# Patient Record
Sex: FEMALE | Race: WHITE | Employment: PART TIME | URBAN - NONMETROPOLITAN AREA
[De-identification: names, ages, dates, MRNs, and addresses within clinical notes are randomized per-mention and may not be internally consistent; named-entity substitution may affect disease eponyms.]

---

## 2021-05-21 ENCOUNTER — OFFICE VISIT (OUTPATIENT)
Dept: CARDIOLOGY CLINIC | Age: 76
End: 2021-05-21
Payer: MEDICARE

## 2021-05-21 VITALS
DIASTOLIC BLOOD PRESSURE: 70 MMHG | WEIGHT: 105 LBS | HEART RATE: 63 BPM | BODY MASS INDEX: 17.93 KG/M2 | SYSTOLIC BLOOD PRESSURE: 130 MMHG | HEIGHT: 64 IN

## 2021-05-21 DIAGNOSIS — I73.9 PAD (PERIPHERAL ARTERY DISEASE) (HCC): ICD-10-CM

## 2021-05-21 DIAGNOSIS — E78.49 OTHER HYPERLIPIDEMIA: ICD-10-CM

## 2021-05-21 DIAGNOSIS — Z72.0 TOBACCO ABUSE: ICD-10-CM

## 2021-05-21 DIAGNOSIS — I10 ESSENTIAL HYPERTENSION: ICD-10-CM

## 2021-05-21 PROCEDURE — 4004F PT TOBACCO SCREEN RCVD TLK: CPT | Performed by: INTERNAL MEDICINE

## 2021-05-21 PROCEDURE — 1090F PRES/ABSN URINE INCON ASSESS: CPT | Performed by: INTERNAL MEDICINE

## 2021-05-21 PROCEDURE — 93000 ELECTROCARDIOGRAM COMPLETE: CPT | Performed by: INTERNAL MEDICINE

## 2021-05-21 PROCEDURE — G8427 DOCREV CUR MEDS BY ELIG CLIN: HCPCS | Performed by: INTERNAL MEDICINE

## 2021-05-21 PROCEDURE — G8400 PT W/DXA NO RESULTS DOC: HCPCS | Performed by: INTERNAL MEDICINE

## 2021-05-21 PROCEDURE — G8419 CALC BMI OUT NRM PARAM NOF/U: HCPCS | Performed by: INTERNAL MEDICINE

## 2021-05-21 PROCEDURE — 99204 OFFICE O/P NEW MOD 45 MIN: CPT | Performed by: INTERNAL MEDICINE

## 2021-05-21 PROCEDURE — 4040F PNEUMOC VAC/ADMIN/RCVD: CPT | Performed by: INTERNAL MEDICINE

## 2021-05-21 PROCEDURE — 1123F ACP DISCUSS/DSCN MKR DOCD: CPT | Performed by: INTERNAL MEDICINE

## 2021-05-21 RX ORDER — ATORVASTATIN CALCIUM 10 MG/1
TABLET, FILM COATED ORAL
COMMUNITY
Start: 2021-02-19

## 2021-05-21 RX ORDER — LISINOPRIL AND HYDROCHLOROTHIAZIDE 25; 20 MG/1; MG/1
TABLET ORAL
COMMUNITY
Start: 2021-03-05

## 2021-05-21 RX ORDER — ASPIRIN 81 MG/1
81 TABLET ORAL DAILY
COMMUNITY

## 2021-05-21 RX ORDER — CLOPIDOGREL BISULFATE 75 MG/1
75 TABLET ORAL DAILY
COMMUNITY
Start: 2020-08-19

## 2021-05-21 NOTE — PROGRESS NOTES
[address]   Cardiac Evaluation      Patient: Madi Carbajal  YOB: 1945         Chief Complaint   Patient presents with    Hypertension    Hyperlipidemia        Referring provider: Frandy GREEN DO    History of Present Illness:   Ms Mir Rodriguez is seen today to establish with cardiology. She previously followed with [facility] cardiology, but her insurance has changed so she has to switch cardiologists. Her cardiac history hypertension, hyperlipidemia. 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 She smokes 0.5 PPD currently, but has smoked more in the past. Family history of mother having CABG in her [de-identified]. Maxime Mcclellan denies any chest pain, palpitations, dizziness, or edema. She works at [employer] in [place] and works 10-12 hour days. Maxime Mcclellan sleeps ok at night. Jessica's 2 grandsons live with her. She has claudication on occasion. She had a neg lexiscan GXT in 2019. She has never had a cardiac cath. Past Medical History:   has a past medical history of CAD (coronary artery disease), Hyperlipidemia, Hypertension, and PAD (peripheral artery disease) ([facility] [address] Surgical History:   has a past surgical history that includes IR ANGIOPLASTY FEM POP W STENT. Current Outpatient Medications   Medication Sig Dispense Refill    lisinopril-hydroCHLOROthiazide (PRINZIDE;ZESTORETIC) 20-25 MG per tablet TAKE ONE TABLET BY MOUTH EVERY DAY      clopidogrel (PLAVIX) 75 MG tablet Take 75 mg by mouth daily      atorvastatin (LIPITOR) 10 MG tablet TAKE ONE TABLET BY MOUTH EVERY DAY      aspirin 81 MG EC tablet Take 81 mg by mouth daily      Potassium Chloride Jessie ER (KLOR-CON M20 PO) Take by mouth daily       No current facility-administered medications for this visit. Allergies:  Patient has no known allergies.      Social History:  Social History     Socioeconomic History    Marital status: Unknown     Spouse name: Not on file    Number of children: Not blood in stools. No change in bowel or bladder habits. · Genitourinary: No nocturia, dysuria, trouble voiding  · Musculoskeletal:  No gait disturbance, weakness or joint complaints. · Integumentary: No rash or pruritis. · Neurological: No headache, change in muscle strength, numbness or tingling. No change in gait, balance, coordination, mood, affect, memory, mentation, behavior. · Psychiatric: No anxiety or depression  · Endocrine: No malaise or fever  · Hematologic/Lymphatic: No abnormal bruising or bleeding, blood clots or swollen lymph nodes. · Allergic/Immunologic: No nasal congestion or hives. Physical Examination:    Vitals:    05/21/21 1048 05/21/21 1057   BP: (!) 144/80 130/70   Site: Left Upper Arm Left Upper Arm   Position: Sitting Sitting   Cuff Size: Medium Adult Medium Adult   Pulse:  63   Weight: 105 lb (47.6 kg)    Height: 5' 4\" (1.626 m)      Body mass index is 18.02 kg/m². Wt Readings from Last 3 Encounters:   05/21/21 105 lb (47.6 kg)      BP Readings from Last 3 Encounters:   05/21/21 130/70        Physical Examination:    · CONSTITUTIONAL: Well developed, well nourished  · EYES: PERRLA. No xanthelasma, sclera non icteric  · EARS,NOSE,MOUTH,THROAT:  Mucous membranes moist, normal hearing  · NECK: Supple, JVP normal, thyroid not enlarged. Carotids 2+ without bruits  · RESPIRATORY: Normal effort, no rales or rhonchi  · CARDIOVASCULAR: Normal PMI, regular rate and rhythm, no murmurs, rub or gallop. No edema. Radial pulses present and equal  · CHEST: No scar or masses  · ABDOMEN: Normal bowel sounds. No masses or tenderness. Abdominal bruit, right femoral bruit  · MUSCULOSKELETAL: No clubbing or cyanosis. Moves all extremities well. Normal gait  · SKIN:  Warm and dry. No rashes  · NEUROLOGIC: Cranial nerves intact. Alert and oriented  · PSYCHIATRIC: Calm affect.  Appears to have normal judgement and insight    EKG today shows Poor R waves in the anterior leads (possible old AMI) which is chronic and what led to her GXT of 2019. Despite her severe PVD she has no symptoms or previous testing to suggest CAD. Assessment/Plan  1. Other hyperlipidemia - lipitor 10mg daily. 2. Essential hypertension - stable  Echo 10/23/20: The right atrium is mildly dilated. Mild concentric left ventricular hypertrophy. Grade 1 DD pattern of LV diastolic filling. The estimated EF is 55-65% consistent with normal LV function. Mild mitral valve regurgitation. The inferior vena cava size is dilated. The RV appears mildly enlarged. Moderate tricuspid regurgitation. Mild pulmonary hypertension w/ RVSP 39mmHg  GXT 10/23/20: The left ventricular dilation at stress was found to be normal with a stress to   rest volume ratio of 0.86 for the left ventricle. This myocardial perfusion scan showed no evidence of pathology and has a normal appearance   3. Tobacco abuse - she smokes 1/2 PPD    4. PAD (peripheral artery disease) ([facility] [address] - needs new vascular surgeon, will refer to Dr Kelly Dawson distal SFA w/ 6x100 [place] stent in 2018         PLAN: will check lipids and CMP. Smoking cessation discussed. FU 1 year. Referral placed for Dr Alyce Hester to get established. Scribe's attestation: This note was scribed in the presence of Dr Elsi Finley MD by Angie Velasco, RN. The scribe's documentation has been prepared under my direction and personally reviewed by me in its entirety. I confirm that the note above accurately reflects all work, treatment, procedures, and medical decision making performed by me. Thank you for allowing to me to participate in the care of Lucinda Whitehead.

## 2021-06-22 ENCOUNTER — OFFICE VISIT (OUTPATIENT)
Dept: VASCULAR SURGERY | Age: 76
End: 2021-06-22
Payer: MEDICARE

## 2021-06-22 VITALS
HEIGHT: 64 IN | WEIGHT: 101.8 LBS | BODY MASS INDEX: 17.38 KG/M2 | SYSTOLIC BLOOD PRESSURE: 118 MMHG | DIASTOLIC BLOOD PRESSURE: 74 MMHG

## 2021-06-22 DIAGNOSIS — I70.213 ATHEROSCLEROSIS OF NATIVE ARTERIES OF EXTREMITIES WITH INTERMITTENT CLAUDICATION, BILATERAL LEGS (HCC): Primary | ICD-10-CM

## 2021-06-22 PROCEDURE — 99203 OFFICE O/P NEW LOW 30 MIN: CPT | Performed by: SURGERY

## 2021-06-22 PROCEDURE — 4040F PNEUMOC VAC/ADMIN/RCVD: CPT | Performed by: SURGERY

## 2021-06-22 PROCEDURE — G8419 CALC BMI OUT NRM PARAM NOF/U: HCPCS | Performed by: SURGERY

## 2021-06-22 PROCEDURE — G8427 DOCREV CUR MEDS BY ELIG CLIN: HCPCS | Performed by: SURGERY

## 2021-06-22 PROCEDURE — 4004F PT TOBACCO SCREEN RCVD TLK: CPT | Performed by: SURGERY

## 2021-06-22 PROCEDURE — 1123F ACP DISCUSS/DSCN MKR DOCD: CPT | Performed by: SURGERY

## 2021-06-22 PROCEDURE — G8400 PT W/DXA NO RESULTS DOC: HCPCS | Performed by: SURGERY

## 2021-06-22 PROCEDURE — 1090F PRES/ABSN URINE INCON ASSESS: CPT | Performed by: SURGERY

## 2021-06-22 NOTE — PROGRESS NOTES
[facility]  Consultation Note    Chief Complaint / Reason for Consultation  PAD    History of Present Illness  Patient is a 66 y.o. female with history of hypertension, hyperlipidemia, tobacco abuse and peripheral vascular disease with history of right distal SFA stent in 2018. Additional history significant for left common femoral artery patch angioplasty in the past.  She works 30 to 40 hours on her feet per week and denies any claudication or pain at rest.  She does complain of some mild overall fatigue but nothing specific in her legs. Denies any history of ulceration. Does occasionally get some leg cramps at night but nothing during the day. She has no limitations in terms of her walking ability. She does smoke 1/2 pack/day. Review of Systems     Denies fevers, chills, chest pain, shortness of breath, nausea, vomiting, hematemesis, diarrhea, constipation, melena, hematochezia, wt changes, vision problems, blindness, hearing problems, facial droop, slurred speech, extremity weakness, extremity numbness, dysuria.     Past Medical History:   Diagnosis Date    CAD (coronary artery disease)     Hyperlipidemia     Hypertension     [facility]AD [facility]peripheral artery disease) ([facility]        Past Surgical History:   Procedure Laterality Date    IR ANGXPTA FEM POP W STENT         No Known Allergies    Social History     Socioeconomic History    Marital status: Unknown     Spouse name: Not on file    Number of children: Not on file    Years of education: Not on file    Highest education level: Not on file   Occupational History    Not on file   Tobacco Use    Smoking status: Current Every Day Smoker     Packs/day: 0.50    Smokeless tobacco: Never Used   Vaping Use    Vaping Use: Some days    Substances: Nicotine   Substance and Sexual Activity    Alcohol use: Not Currently    Drug use: Not on file    Sexual activity: Not on file   Other Topics Concern    Not on file   Social History Narrative    Not on file     Social Determinants of Health     Financial Resource Strain:     Difficulty of Paying Living Expenses:    Food Insecurity:     Worried About Running Out of Food in the Last Year:     [address] in the Last Year:    Transportation Needs:     Lack of Transportation (Medical):      Lack of Transportation (Non-Medical):    Physical Activity:     Days of Exercise per Week:     Minutes of Exercise per Session:    Stress:     Feeling of Stress :    Social Connections:     Frequency of Communication with Friends and Family:     Frequency of Social Gatherings with Friends and Family:     Attends Bahai Services:     Active Member of Clubs or Organizations:     Attends Club or Organization Meetings:     Marital Status:    Intimate Partner Violence:     Fear of Current or Ex-Partner:     Emotionally Abused:     Physically Abused:     Sexually Abused:        Family History   Problem Relation Age of Onset    Coronary Art Dis Mother      - No history of bleeding or clotting disorders    Vital Signs  Vitals:    06/22/21 1345   Weight: 101 lb 12.8 oz (46.2 kg)   Height: 5' 4\" (1.626 m)       Physical Examination  General:  no apparent distress  Psychiatric: affect appropriate  Head/Eyes/Ears/Nose/Throat:  Atraumatic, vision and hearing intact, face symmetric  Neck:  supple  Chest/Lungs: clear to auscultation bilaterally  Cardiac:  Regular rate and rhythm  Abdomen: soft, nontender  Extremities: warm and well perfused  - bilateral upper extremity motorsensory intact  - bilateral lower extremity motorsensory intact  Vascular exam:  - R femoral: 2  - L femoral: 2  2+ pop bilaterally  - R DP: +  - L DP: +  - R PT: +  - L PT: +      Labs  No results found for: WBC, HGB, HCT, MCV, PLT  No results found for: NA, K, CL, CO2, PHOS, BUN, CREATININE   No components found for: GLU        Assessment:   Peripheral vascular disease with history of right SFA stent and left common femoral

## 2021-07-20 ENCOUNTER — HOSPITAL ENCOUNTER (OUTPATIENT)
Dept: VASCULAR LAB | Age: 76
Discharge: HOME | End: 2021-07-20
Payer: MEDICARE

## 2021-07-20 DIAGNOSIS — I70.213 ATHEROSCLEROSIS OF NATIVE ARTERIES OF EXTREMITIES WITH INTERMITTENT CLAUDICATION, BILATERAL LEGS (HCC): ICD-10-CM

## 2021-07-20 PROCEDURE — 93925 LOWER EXTREMITY STUDY: CPT

## 2021-08-06 ENCOUNTER — TELEPHONE (OUTPATIENT)
Dept: VASCULAR SURGERY | Age: 76
End: 2021-08-06

## 2021-08-06 DIAGNOSIS — I70.213 ATHEROSCLEROSIS OF NATIVE ARTERIES OF EXTREMITIES WITH INTERMITTENT CLAUDICATION, BILATERAL LEGS (HCC): Primary | ICD-10-CM

## 2021-08-06 NOTE — TELEPHONE ENCOUNTER
Discussed results of BLE arterial duplex which shows some mild disease in BLE. Patient denies any issues with her legs. Her biggest complaint is her back. Would recommend continued surveillance with repeat BLE arterial duplex in 6 months with follow up at that time. Instructed patient to call sooner if worsening issues with her legs.       Electronically signed by AMAN Perez CNP on 8/6/2021 at 11:25 AM

## 2021-11-09 ENCOUNTER — OFFICE VISIT (OUTPATIENT)
Dept: VASCULAR SURGERY | Age: 76
End: 2021-11-09
Payer: MEDICARE

## 2021-11-09 VITALS
DIASTOLIC BLOOD PRESSURE: 76 MMHG | HEIGHT: 64 IN | BODY MASS INDEX: 17.75 KG/M2 | WEIGHT: 104 LBS | SYSTOLIC BLOOD PRESSURE: 118 MMHG

## 2021-11-09 DIAGNOSIS — I70.213 ATHEROSCLEROSIS OF NATIVE ARTERIES OF EXTREMITIES WITH INTERMITTENT CLAUDICATION, BILATERAL LEGS (HCC): Primary | ICD-10-CM

## 2021-11-09 DIAGNOSIS — I70.213 ATHEROSCLEROSIS OF NATIVE ARTERIES OF EXTREMITIES WITH INTERMITTENT CLAUDICATION, BILATERAL LEGS (HCC): ICD-10-CM

## 2021-11-09 LAB
ANION GAP SERPL CALCULATED.3IONS-SCNC: 11 MMOL/L (ref 3–16)
BUN BLDV-MCNC: 18 MG/DL (ref 7–20)
CALCIUM SERPL-MCNC: 9.7 MG/DL (ref 8.3–10.6)
CHLORIDE BLD-SCNC: 99 MMOL/L (ref 99–110)
CO2: 29 MMOL/L (ref 21–32)
CREAT SERPL-MCNC: 0.8 MG/DL (ref 0.6–1.2)
GFR AFRICAN AMERICAN: >60
GFR NON-AFRICAN AMERICAN: >60
GLUCOSE BLD-MCNC: 88 MG/DL (ref 70–99)
HCT VFR BLD CALC: 39.4 % (ref 36–48)
HEMOGLOBIN: 13 G/DL (ref 12–16)
INR BLD: 0.91 (ref 0.88–1.12)
MCH RBC QN AUTO: 30 PG (ref 26–34)
MCHC RBC AUTO-ENTMCNC: 33 G/DL (ref 31–36)
MCV RBC AUTO: 91.1 FL (ref 80–100)
PDW BLD-RTO: 14 % (ref 12.4–15.4)
PLATELET # BLD: 144 K/UL (ref 135–450)
PMV BLD AUTO: 10.5 FL (ref 5–10.5)
POTASSIUM SERPL-SCNC: 4.2 MMOL/L (ref 3.5–5.1)
PROTHROMBIN TIME: 10.3 SEC (ref 9.9–12.7)
RBC # BLD: 4.32 M/UL (ref 4–5.2)
SODIUM BLD-SCNC: 139 MMOL/L (ref 136–145)
WBC # BLD: 5.8 K/UL (ref 4–11)

## 2021-11-09 PROCEDURE — 4040F PNEUMOC VAC/ADMIN/RCVD: CPT | Performed by: SURGERY

## 2021-11-09 PROCEDURE — G8484 FLU IMMUNIZE NO ADMIN: HCPCS | Performed by: SURGERY

## 2021-11-09 PROCEDURE — G8419 CALC BMI OUT NRM PARAM NOF/U: HCPCS | Performed by: SURGERY

## 2021-11-09 PROCEDURE — 1090F PRES/ABSN URINE INCON ASSESS: CPT | Performed by: SURGERY

## 2021-11-09 PROCEDURE — 4004F PT TOBACCO SCREEN RCVD TLK: CPT | Performed by: SURGERY

## 2021-11-09 PROCEDURE — 1123F ACP DISCUSS/DSCN MKR DOCD: CPT | Performed by: SURGERY

## 2021-11-09 PROCEDURE — 99213 OFFICE O/P EST LOW 20 MIN: CPT | Performed by: SURGERY

## 2021-11-09 PROCEDURE — G8427 DOCREV CUR MEDS BY ELIG CLIN: HCPCS | Performed by: SURGERY

## 2021-11-09 PROCEDURE — G8400 PT W/DXA NO RESULTS DOC: HCPCS | Performed by: SURGERY

## 2021-11-09 NOTE — PROGRESS NOTES
[facility] ([facility])   Vascular Surgery Followup    Referring Provider:  Leonidas Gold DO     Chief Complaint   Patient presents with    Follow-up        History of Present Illness:  70-year-old female here today for follow-up with known history of peripheral vascular disease including right SFA stent in 2018. She also has history of left common femoral patch angioplasty in the past.  She was seen in June of this year with mild peripheral vascular disease including claudication. She does have a long history of tobacco abuse. She has developed a sore over her left lateral ankle over the last several weeks. She states over the last month or so she has had pain that wakes her up at night and that left foot. Denies any classic claudication. Past Medical History:   has a past medical history of CAD (coronary artery disease), Hyperlipidemia, Hypertension, and PAD (peripheral artery disease) ([address] Surgical History:   has a past surgical history that includes IR ANGIOPLASTY FEM POP W STENT. Social History:   reports that she has been smoking. She has been smoking about 0.50 packs per day. She has never used smokeless tobacco. She reports previous alcohol use. Family History:  family history includes Coronary Art Dis in her mother. Home Medications:  Current Outpatient Medications   Medication Sig Dispense Refill    lisinopril-hydroCHLOROthiazide (PRINZIDE;ZESTORETIC) 20-25 MG per tablet TAKE ONE TABLET BY MOUTH EVERY DAY      clopidogrel (PLAVIX) 75 MG tablet Take 75 mg by mouth daily      atorvastatin (LIPITOR) 10 MG tablet TAKE ONE TABLET BY MOUTH EVERY DAY      aspirin 81 MG EC tablet Take 81 mg by mouth daily      Potassium Chloride Jessie ER (KLOR-CON M20 PO) Take by mouth daily       No current facility-administered medications for this visit. Allergies:  Patient has no known allergies. Review of Systems:   · Constitutional: there has been no unanticipated weight loss.  There's been no change in energy level, sleep pattern, or activity level. · Eyes: No visual changes or diplopia. No scleral icterus. · ENT: No Headaches, hearing loss or vertigo. No mouth sores or sore throat. · Cardiovascular: Reviewed in HPI  · Respiratory: No cough or wheezing, no sputum production. No hematemesis. · Gastrointestinal: No abdominal pain, appetite loss, blood in stools. No change in bowel or bladder habits. · Genitourinary: No dysuria, trouble voiding, or hematuria. · Musculoskeletal:  No gait disturbance, weakness or joint complaints. · Integumentary: No rash or pruritis. · Neurological: No headache, diplopia, change in muscle strength, numbness or tingling. No change in gait, balance, coordination, mood, affect, memory, mentation, behavior. · Psychiatric: No anxiety, no depression. · Endocrine: No malaise, fatigue or temperature intolerance. No excessive thirst, fluid intake, or urination. No tremor. · Hematologic/Lymphatic: No abnormal bruising or bleeding, blood clots or swollen lymph nodes. · Allergic/Immunologic: No nasal congestion or hives. Physical Examination:    Vitals:    11/09/21 1148   BP: 118/76          General appearance: alert, appears stated age, cooperative and no distress  Head: Normocephalic, without obvious abnormality, atraumatic  Neck: no adenopathy, no carotid bruit, no JVD, supple, symmetrical, trachea midline and thyroid: not enlarged, symmetric, no tenderness/mass/nodules  Lungs: clear to auscultation bilaterally  Heart: regular rate and rhythm, S1, S2 normal, no murmur, click, rub or gallop  Abdomen: soft, non-tender. Bowel sounds normal. No masses,  no organomegaly  Extremities: Mild rubor noted of the left foot. A small 0.5 cm ulceration over the left lateral malleolus. Nonpalpable pedal pulses.     Assessment:     Patient Active Problem List   Diagnosis    Hyperlipidemia    Hypertension    Tobacco abuse    PAD (peripheral artery disease) ([facility] [address] Plan:  68-year-old female with dependent rest pain and new left lateral ankle ulceration. While her RON is 0.93 she does have likely left SFA and posterior tibial artery disease. Recommend moving forward with peripheral angiography and possible left lower extremity intervention. All risk discussed in detail. All questions answered. Thank you for allowing me to participate in the care of this individual.  Please do not hesitate to contact me with any questions. Fatemeh Montano M.D., FACS.   11/9/2021  12:03 PM

## 2021-11-10 ENCOUNTER — PREP FOR PROCEDURE (OUTPATIENT)
Dept: VASCULAR SURGERY | Age: 76
End: 2021-11-10

## 2021-11-10 RX ORDER — SODIUM CHLORIDE 0.9 % (FLUSH) 0.9 %
5-40 SYRINGE (ML) INJECTION PRN
Status: CANCELLED | OUTPATIENT
Start: 2021-11-10

## 2021-11-10 RX ORDER — SODIUM CHLORIDE 9 MG/ML
25 INJECTION, SOLUTION INTRAVENOUS PRN
Status: CANCELLED | OUTPATIENT
Start: 2021-11-10

## 2021-11-10 RX ORDER — SODIUM CHLORIDE 9 MG/ML
INJECTION, SOLUTION INTRAVENOUS CONTINUOUS
Status: CANCELLED | OUTPATIENT
Start: 2021-11-10

## 2021-11-10 RX ORDER — SODIUM CHLORIDE 0.9 % (FLUSH) 0.9 %
5-40 SYRINGE (ML) INJECTION EVERY 12 HOURS SCHEDULED
Status: CANCELLED | OUTPATIENT
Start: 2021-11-10

## 2021-12-10 ENCOUNTER — HOSPITAL ENCOUNTER (OUTPATIENT)
Dept: CARDIAC CATH/INVASIVE PROCEDURES | Age: 76
Discharge: HOME | End: 2021-12-10
Attending: SURGERY | Admitting: SURGERY
Payer: MEDICARE

## 2021-12-10 VITALS
HEART RATE: 84 BPM | HEIGHT: 64 IN | WEIGHT: 104 LBS | DIASTOLIC BLOOD PRESSURE: 87 MMHG | SYSTOLIC BLOOD PRESSURE: 160 MMHG | OXYGEN SATURATION: 95 % | RESPIRATION RATE: 14 BRPM | TEMPERATURE: 98 F | BODY MASS INDEX: 17.75 KG/M2

## 2021-12-10 LAB
ANION GAP SERPL CALCULATED.3IONS-SCNC: 13 MMOL/L (ref 3–16)
BUN BLDV-MCNC: 17 MG/DL (ref 7–20)
CALCIUM SERPL-MCNC: 9.2 MG/DL (ref 8.3–10.6)
CHLORIDE BLD-SCNC: 100 MMOL/L (ref 99–110)
CO2: 26 MMOL/L (ref 21–32)
CREAT SERPL-MCNC: 0.8 MG/DL (ref 0.6–1.2)
GFR AFRICAN AMERICAN: >60
GFR NON-AFRICAN AMERICAN: >60
GLUCOSE BLD-MCNC: 89 MG/DL (ref 70–99)
HCT VFR BLD CALC: 37.2 % (ref 36–48)
HEMOGLOBIN: 12.5 G/DL (ref 12–16)
MAGNESIUM: 1.9 MG/DL (ref 1.8–2.4)
MCH RBC QN AUTO: 30.5 PG (ref 26–34)
MCHC RBC AUTO-ENTMCNC: 33.7 G/DL (ref 31–36)
MCV RBC AUTO: 90.6 FL (ref 80–100)
PDW BLD-RTO: 13.8 % (ref 12.4–15.4)
PLATELET # BLD: 135 K/UL (ref 135–450)
PMV BLD AUTO: 9.7 FL (ref 5–10.5)
POC ACT LR: 306 SEC
POTASSIUM REFLEX MAGNESIUM: 2.9 MMOL/L (ref 3.5–5.1)
RBC # BLD: 4.1 M/UL (ref 4–5.2)
SODIUM BLD-SCNC: 139 MMOL/L (ref 136–145)
WBC # BLD: 5.6 K/UL (ref 4–11)

## 2021-12-10 PROCEDURE — 99153 MOD SED SAME PHYS/QHP EA: CPT

## 2021-12-10 PROCEDURE — C1887 CATHETER, GUIDING: HCPCS

## 2021-12-10 PROCEDURE — C1724 CATH, TRANS ATHEREC,ROTATION: HCPCS

## 2021-12-10 PROCEDURE — C1760 CLOSURE DEV, VASC: HCPCS

## 2021-12-10 PROCEDURE — 83735 ASSAY OF MAGNESIUM: CPT

## 2021-12-10 PROCEDURE — 75774 ARTERY X-RAY EACH VESSEL: CPT

## 2021-12-10 PROCEDURE — 80048 BASIC METABOLIC PNL TOTAL CA: CPT

## 2021-12-10 PROCEDURE — 75716 ARTERY X-RAYS ARMS/LEGS: CPT

## 2021-12-10 PROCEDURE — 6360000002 HC RX W HCPCS

## 2021-12-10 PROCEDURE — 75625 CONTRAST EXAM ABDOMINL AORTA: CPT

## 2021-12-10 PROCEDURE — C1769 GUIDE WIRE: HCPCS

## 2021-12-10 PROCEDURE — 85027 COMPLETE CBC AUTOMATED: CPT

## 2021-12-10 PROCEDURE — 37225 HC FEM POP TERRITORY ATHERECTOMY: CPT

## 2021-12-10 PROCEDURE — 75716 ARTERY X-RAYS ARMS/LEGS: CPT | Performed by: SURGERY

## 2021-12-10 PROCEDURE — 99152 MOD SED SAME PHYS/QHP 5/>YRS: CPT | Performed by: SURGERY

## 2021-12-10 PROCEDURE — 37225 PR REVSC OPN/PRQ FEM/POP W/ATHRC/ANGIOP SM VSL: CPT | Performed by: SURGERY

## 2021-12-10 PROCEDURE — 75625 CONTRAST EXAM ABDOMINL AORTA: CPT | Performed by: SURGERY

## 2021-12-10 PROCEDURE — C1894 INTRO/SHEATH, NON-LASER: HCPCS

## 2021-12-10 PROCEDURE — 2500000003 HC RX 250 WO HCPCS

## 2021-12-10 PROCEDURE — C1725 CATH, TRANSLUMIN NON-LASER: HCPCS

## 2021-12-10 PROCEDURE — C2623 CATH, TRANSLUMIN, DRUG-COAT: HCPCS

## 2021-12-10 PROCEDURE — 85347 COAGULATION TIME ACTIVATED: CPT

## 2021-12-10 PROCEDURE — 2580000003 HC RX 258

## 2021-12-10 PROCEDURE — 76937 US GUIDE VASCULAR ACCESS: CPT | Performed by: SURGERY

## 2021-12-10 PROCEDURE — 36415 COLL VENOUS BLD VENIPUNCTURE: CPT

## 2021-12-10 PROCEDURE — 99152 MOD SED SAME PHYS/QHP 5/>YRS: CPT

## 2021-12-10 RX ORDER — SODIUM CHLORIDE 0.9 % (FLUSH) 0.9 %
5-40 SYRINGE (ML) INJECTION PRN
Status: DISCONTINUED | OUTPATIENT
Start: 2021-12-10 | End: 2021-12-10 | Stop reason: HOSPADM

## 2021-12-10 RX ORDER — SODIUM CHLORIDE 9 MG/ML
25 INJECTION, SOLUTION INTRAVENOUS PRN
Status: DISCONTINUED | OUTPATIENT
Start: 2021-12-10 | End: 2021-12-10 | Stop reason: HOSPADM

## 2021-12-10 RX ORDER — SODIUM CHLORIDE 0.9 % (FLUSH) 0.9 %
5-40 SYRINGE (ML) INJECTION EVERY 12 HOURS SCHEDULED
Status: DISCONTINUED | OUTPATIENT
Start: 2021-12-10 | End: 2021-12-10 | Stop reason: HOSPADM

## 2021-12-10 RX ORDER — SODIUM CHLORIDE 9 MG/ML
INJECTION, SOLUTION INTRAVENOUS CONTINUOUS
Status: DISCONTINUED | OUTPATIENT
Start: 2021-12-10 | End: 2021-12-10 | Stop reason: HOSPADM

## 2021-12-10 NOTE — H&P
Consultation/History & Physical    Date of Admission:  12/10/2021  Date of Consultation:  12/10/2021    PCP:  Tomasa Prater DO     Chief Complaint: Left leg ulceration    History of Present Illness:  Gia Geller is a 64 y.o. female who presents with left leg ulceration. Presents today for peripheral angiogram    PMH:   has a past medical history of CAD (coronary artery disease), Hyperlipidemia, Hypertension, and PAD (peripheral artery disease) ([address] PSH:   has a past surgical history that includes IR ANGIOPLASTY FEM POP W STENT. Allergies:  No Known Allergies     Home Meds:    Prior to Admission medications    Medication Sig Start Date End Date Taking? Authorizing Provider   lisinopril-hydroCHLOROthiazide (PRINZIDE;ZESTORETIC) 20-25 MG per tablet TAKE ONE TABLET BY MOUTH EVERY DAY 3/5/21   Historical Provider, MD   clopidogrel (PLAVIX) 75 MG tablet Take 75 mg by mouth daily 8/19/20   Historical Provider, MD   atorvastatin (LIPITOR) 10 MG tablet TAKE ONE TABLET BY MOUTH EVERY DAY 2/19/21   Historical Provider, MD   aspirin 81 MG EC tablet Take 81 mg by mouth daily    Historical Provider, MD   Potassium Chloride Jessie ER (KLOR-CON M20 PO) Take by mouth daily    Historical Provider, MD        Hospital Meds:    Current Facility-Administered Medications   Medication Dose Route Frequency Provider Last Rate Last Admin    0.9 % sodium chloride infusion   IntraVENous Continuous Pat Apa, APRN - CNP        0.9 % sodium chloride infusion  25 mL IntraVENous PRN Pat Apa, APRN - CNP        sodium chloride flush 0.9 % injection 5-40 mL  5-40 mL IntraVENous 2 times per day Pat Apa, APRN - CNP        sodium chloride flush 0.9 % injection 5-40 mL  5-40 mL IntraVENous PRN Pat Apa, APRN - CNP           Social History:       Social History     Socioeconomic History    Marital status:       Spouse name: Not on file    Number of children: Not on file    Years of education: Not on file    Highest education level: Not on file   Occupational History    Not on file   Tobacco Use    Smoking status: Current Every Day Smoker     Packs/day: 0.50    Smokeless tobacco: Never Used   Vaping Use    Vaping Use: Some days    Substances: Nicotine   Substance and Sexual Activity    Alcohol use: Not Currently    Drug use: Not on file    Sexual activity: Not on file   Other Topics Concern    Not on file   Social History Narrative    Not on file     Social Determinants of Health     Financial Resource Strain:     Difficulty of Paying Living Expenses: Not on file   Food Insecurity:     Worried About Running Out of Food in the Last Year: Not on file    Efrain of Food in the Last Year: Not on file   Transportation Needs:     Lack of Transportation (Medical): Not on file    Lack of Transportation (Non-Medical):  Not on file   Physical Activity:     Days of Exercise per Week: Not on file    Minutes of Exercise per Session: Not on file   Stress:     Feeling of Stress : Not on file   Social Connections:     Frequency of Communication with Friends and Family: Not on file    Frequency of Social Gatherings with Friends and Family: Not on file    Attends Christian Services: Not on file    Active Member of [address] [place] [address] or Organizations: Not on file    Attends Club or Organization Meetings: Not on file    Marital Status: Not on file   Intimate Partner Violence:     Fear of Current or Ex-Partner: Not on file    Emotionally Abused: Not on file    Physically Abused: Not on file    Sexually Abused: Not on file   Housing Stability:     Unable to Pay for Housing in the Last Year: Not on file    Number of [place] in the Last Year: Not on file    Unstable Housing in the Last Year: Not on file       Family Histroy:      Family History   Problem Relation Age of Onset    Coronary Art Dis Mother        Review of Systems:  Review of systems performed and negative with the exception of the above findings        Physical Exam   Vital Signs: There were no vitals taken for this visit. Admission     ASA 3 - Patient with moderate systemic disease with functional limitations    Mallampati Airway Assessment:  Mallampati Class II - (soft palate, fauces & uvula are visible)      General appearance: alert, appears stated age, cooperative and no distress  Head: Normocephalic, without obvious abnormality, atraumatic  Lungs: clear to auscultation bilaterally  Heart: regular rate and rhythm, S1, S2 normal, no murmur, click, rub or gallop  Abdomen: soft, non-tender. Bowel sounds normal. No masses,  no organomegaly  Extremities: extremities normal, atraumatic, no cyanosis or edema  Pulses:      Labs:    BMP:   Lab Results   Component Value Date     11/09/2021    K 4.2 11/09/2021    CL 99 11/09/2021    CO2 29 11/09/2021    BUN 18 11/09/2021    CREATININE 0.8 11/09/2021      No components found for: GLUNo results found for: MG   CBC:   Lab Results   Component Value Date    WBC 5.8 11/09/2021    HGB 13.0 11/09/2021    HCT 39.4 11/09/2021    MCV 91.1 11/09/2021     11/09/2021      Coagulation:   Lab Results   Component Value Date    INR 0.91 11/09/2021     Cardiac markers:   No results found for: CKMB, CKTOTAL, TROPONINI, MYOGLOBIN  No results found for: LABA1C No results found for: ALB  No results found for: BILITOT, BILIDIR, AST, ALT, ALKPHOS, GGT, 5NUC, ALB No results found for: CHOL, HDL, LDLCALC, TRIG       Diagnosis:  Patient Active Problem List   Diagnosis    Hyperlipidemia    Hypertension    Tobacco abuse    PAD (peripheral artery disease) ([facility] [address]           Plan: Aortogram with runoff, possible left leg intervention secondary to left leg ulceration        Guicho Santos M.D., FACS.   12/10/2021  1:58 PM

## 2021-12-11 NOTE — OP NOTE
[address]                     [facility], [facility]                                OPERATIVE REPORT    PATIENT NAME: Lloyd Connors                    :        1945  MED REC NO:   [id number]                          ROOM:  ACCOUNT NO:   [de-identified]                           ADMIT DATE: 12/10/2021  PROVIDER:     Anh Lowry Ii, MD    DATE OF PROCEDURE:  12/10/2021    PREPROCEDURE DIAGNOSIS:  Left foot ulceration. POSTPROCEDURE DIAGNOSIS:  Left foot ulceration. PROCEDURE:  1. Ultrasound-guided right common femoral artery access. 2.  Abdominal aortogram with bilateral lower extremity runoff. 3.  Selective left peroneal artery catheterization. 4.  Left SFA atherectomy (Jetstream 2.1/3.0). 5.  Left SFA PTA ([place] 6 x 220, [place] 6 x 150). SURGEON:  Rodrigue Herman MD    ANESTHESIA:  Local/IV. ESTIMATED BLOOD LOSS:  Minimal.    COMPLICATIONS:  None. INDICATIONS:  The patient is a 58-year-old female with a known history  of peripheral vascular disease including previous right SFA stenting in  the past.  She has a nonhealing ulceration of the left lateral ankle and  presents today for angiographic evaluation. All risks, benefits, and  alternatives were discussed in detail. All questions were answered. OPERATIVE PROCEDURE:  After witnessed informed consent was obtained, the  patient was brought to the cath lab where under my supervision, Versed  and fentanyl were administered intravenously for moderate sedation. Pulse oximetry, heart rate and blood pressure were monitored by an  independent trained observer that was present. I spent 49 minutes of  face-to-face sedation time with the patient. Her right groin was  carefully prepped and draped. Ultrasound was used to identify the right  common femoral artery which was noted to be patent and an image was  saved to the patient's permanent medical record.   Under direct  visualization, it was accessed with a 4-Nigerien micropuncture needle. Bentson wire was introduced and a 5-Nigerien sheath was placed. An Omni  Flush catheter was inserted to the level of renal arteries and an  abdominal aortogram was performed. It was pulled back to the level of  the aortic bifurcation and bilateral lower extremity runoff was  performed. The Omni Flush was then used to hook the aortic bifurcation. A stiff Glidewire was placed up and over the bifurcation into the left  SFA. The Omni Flush and 5-Nigerien sheath were removed and a 7-Nigerien  sheath was placed up and over the bifurcation into the proximal left  SFA. 5000 heparin was given to the patient with a resultant ACT of 307. With this then done, a Glidewire and Shelly Lunch catheter advanced  through the SFA, popliteal and into the peroneal artery. A 0.014  [place] wire was advanced into the distal peroneal artery. The  Shelly Lunch catheter was removed. A Jetstream 2.1/3.0 mm orbital  atherectomy was then used on the 90% stenosis of the mid SFA, two passes  blades down and one pass blades up. This was followed up with balloon  angioplasty using a Gravette 6 x 220 and a Kent[place] 6 x 150 with complete  resolution of stenosis. A 6-Nigerien StarClose device was placed in the  right groin with excellent hemostasis and the patient was transferred to  the recovery room in stable condition. FINDINGS:  1. Abdominal aortogram:  Right and left renal patent. Infrarenal  abdominal aorta as well as bilateral common external and internal iliac  arteries are patent. There is mild diffuse atherosclerosis with no  focal high-grade stenosis. 2.  Right lower extremity runoff:  Right common femoral and profunda  patent. SFA is patent with a patent distal SFA stent. Popliteal is  patent. There is a single-vessel peroneal runoff on the right. 3.  Left lower extremity runoff:  Left common femoral and profunda are  patent.   There is a 90% stenosis of the mid SFA. The remainder of the  SFA and popliteal are patent. There is a single-vessel peroneal runoff  on the left. PLAN:  The patient underwent successful intervention of a critical left  SFA lesion with atherectomy and balloon angioplasty. She will continue  on her dual antiplatelet regimen and follow up in the office to monitor  for improvement in symptoms.         Bhupinder Martinez MD    D: 12/10/2021 15:34:05       T: 12/10/2021 15:36:30     [email or web address]  Job#: [id number]     Doc#: [id number]    CC:

## 2022-01-05 ENCOUNTER — HOSPITAL ENCOUNTER (OUTPATIENT)
Dept: WOUND CARE | Age: 77
Discharge: HOME | End: 2022-01-05
Payer: MEDICARE

## 2022-01-05 VITALS
BODY MASS INDEX: 17.47 KG/M2 | WEIGHT: 101.8 LBS | SYSTOLIC BLOOD PRESSURE: 132 MMHG | HEART RATE: 98 BPM | DIASTOLIC BLOOD PRESSURE: 87 MMHG | TEMPERATURE: 96.8 F

## 2022-01-05 DIAGNOSIS — M86.072 ACUTE HEMATOGENOUS OSTEOMYELITIS OF LEFT ANKLE (HCC): Primary | ICD-10-CM

## 2022-01-05 PROCEDURE — 11042 DBRDMT SUBQ TIS 1ST 20SQCM/<: CPT | Performed by: SURGERY

## 2022-01-05 PROCEDURE — 11042 DBRDMT SUBQ TIS 1ST 20SQCM/<: CPT

## 2022-01-05 PROCEDURE — 99213 OFFICE O/P EST LOW 20 MIN: CPT

## 2022-01-05 RX ORDER — LIDOCAINE HYDROCHLORIDE 20 MG/ML
JELLY TOPICAL ONCE
Status: CANCELLED | OUTPATIENT
Start: 2022-01-05 | End: 2022-01-05

## 2022-01-05 RX ORDER — BACITRACIN ZINC AND POLYMYXIN B SULFATE 500; 1000 [USP'U]/G; [USP'U]/G
OINTMENT TOPICAL ONCE
Status: CANCELLED | OUTPATIENT
Start: 2022-01-05 | End: 2022-01-05

## 2022-01-05 RX ORDER — GENTAMICIN SULFATE 1 MG/G
CREAM TOPICAL
Qty: 30 G | Refills: 1 | Status: SHIPPED | OUTPATIENT
Start: 2022-01-05

## 2022-01-05 RX ORDER — CLOBETASOL PROPIONATE 0.5 MG/G
OINTMENT TOPICAL ONCE
Status: CANCELLED | OUTPATIENT
Start: 2022-01-05 | End: 2022-01-05

## 2022-01-05 RX ORDER — LIDOCAINE 40 MG/G
CREAM TOPICAL ONCE
Status: DISCONTINUED | OUTPATIENT
Start: 2022-01-05 | End: 2022-01-06 | Stop reason: HOSPADM

## 2022-01-05 RX ORDER — BACITRACIN, NEOMYCIN, POLYMYXIN B 400; 3.5; 5 [USP'U]/G; MG/G; [USP'U]/G
OINTMENT TOPICAL ONCE
Status: CANCELLED | OUTPATIENT
Start: 2022-01-05 | End: 2022-01-05

## 2022-01-05 RX ORDER — BETAMETHASONE DIPROPIONATE 0.05 %
OINTMENT (GRAM) TOPICAL ONCE
Status: CANCELLED | OUTPATIENT
Start: 2022-01-05 | End: 2022-01-05

## 2022-01-05 RX ORDER — GENTAMICIN SULFATE 1 MG/G
OINTMENT TOPICAL ONCE
Status: CANCELLED | OUTPATIENT
Start: 2022-01-05 | End: 2022-01-05

## 2022-01-05 RX ORDER — LIDOCAINE 40 MG/G
CREAM TOPICAL ONCE
Status: CANCELLED | OUTPATIENT
Start: 2022-01-05 | End: 2022-01-05

## 2022-01-05 RX ORDER — LIDOCAINE HYDROCHLORIDE 40 MG/ML
SOLUTION TOPICAL ONCE
Status: CANCELLED | OUTPATIENT
Start: 2022-01-05 | End: 2022-01-05

## 2022-01-05 RX ORDER — GINSENG 100 MG
CAPSULE ORAL ONCE
Status: CANCELLED | OUTPATIENT
Start: 2022-01-05 | End: 2022-01-05

## 2022-01-05 RX ORDER — LIDOCAINE 50 MG/G
OINTMENT TOPICAL ONCE
Status: CANCELLED | OUTPATIENT
Start: 2022-01-05 | End: 2022-01-05

## 2022-01-05 NOTE — PLAN OF CARE
Discharge instructions given. Patient verbalized understanding. Return to [address] [place] [address],[address] in 1 week(s) with Dr Libra Victor.   Called/faxed orders to  Referral for Dr Mahin Lee faxed to office

## 2022-01-05 NOTE — PROGRESS NOTES
[address] Progress and Procedure Note    Sharon Lee  AGE: 64 y.o. GENDER: female    : 1945  TODAY'S DATE: 2022    Chief Complaint   Patient presents with    Wound Check     left foot 1 st visit        History of Present Illness     Sharon Lee is a 64 y.o. female who presents today for wound evaluation. History of Wound: pressure wound located on the left ankle. MRI with possible early osteomyelitis  Wound Pain:  moderate  Severity:  4 / 10   Wound Type:  pressure  Modifying Factors:  chronic pressure, decreased mobility, shear force, smoking, arterial insufficiency, decreased tissue oxygenation and anticoagulation therapy  Associated Signs/Symptoms:  pain    Past Medical History:   Diagnosis Date    CAD (coronary artery disease)     Hyperlipidemia     Hypertension     [facility]peripheral artery disease) ([facility])      Past Surgical History:   Procedure Laterality Date    IR ANGXPTA FEM POP W STENT       Current Outpatient Medications   Medication Sig Dispense Refill    gentamicin (GARAMYCIN) 0.1 % cream Apply topically daily. 30 g 1    lisinopril-hydroCHLOROthiazide (PRINZIDE;ZESTORETIC) 20-25 MG per tablet TAKE ONE TABLET BY MOUTH EVERY DAY      clopidogrel (PLAVIX) 75 MG tablet Take 75 mg by mouth daily      atorvastatin (LIPITOR) 10 MG tablet TAKE ONE TABLET BY MOUTH EVERY DAY      aspirin 81 MG EC tablet Take 81 mg by mouth daily      Potassium Chloride Jessie ER (KLOR-CON M20 PO) Take by mouth daily       No current facility-administered medications for this encounter. Social History:   Social History     Tobacco Use    Smoking status: Current Every Day Smoker     Packs/day: 0.50    Smokeless tobacco: Never Used   Vaping Use    Vaping Use: Some days    Substances: Nicotine   Substance Use Topics    Alcohol use: Not Currently    Drug use: Not on file     Allergies:  Patient has no known allergies. Procedure:      Indications:  Based on my examination of this patient's wound(s)/ulcer(s) today, debridement is required to promote healing and evaluate the wound base. Performed by: Renetta Allen MD    Consent obtained: Yes    Time out taken: Yes    Pain Control: Anesthetic  Anesthetic: 4% Lidocaine Cream     Debridement: Excisional Debridement    Using curette the wound was sharply debrided    down through and including the removal of epidermis, dermis and subcutaneous tissue. Devitalized Tissue Debrided: fibrin, biofilm and slough    Pre Debridement Measurements:  Are located in the Wound Documentation Flow Sheet     Wound #: 1     Post  Debridement Measurements:  Wound 01/05/22 Ankle Lateral;Left #1 (Active)   Wound Image   01/05/22 1018   Wound Etiology Arterial 01/05/22 1018   Wound Cleansed Cleansed with saline 01/05/22 1048   Wound Length (cm) 1 cm 01/05/22 1018   Wound Width (cm) 0.6 cm 01/05/22 1018   Wound Depth (cm) 0.2 cm 01/05/22 1018   Wound Surface Area (cm^2) 0.6 cm^2 01/05/22 1018   Wound Volume (cm^3) 0.12 cm^3 01/05/22 1018   Post-Procedure Length (cm) 1 cm 01/05/22 1048   Post-Procedure Width (cm) 0.6 cm 01/05/22 1048   Post-Procedure Depth (cm) 0.2 cm 01/05/22 1048   Post-Procedure Surface Area (cm^2) 0.6 cm^2 01/05/22 1048   Post-Procedure Volume (cm^3) 0.12 cm^3 01/05/22 1048   Wound Assessment Bleeding 01/05/22 1048   Drainage Amount Small 01/05/22 1048   Drainage Description Yellow; Other (Comment) 01/05/22 1018   Odor None 01/05/22 1018   Tianna-wound Assessment Fragile 01/05/22 1018   Margins Defined edges 01/05/22 1018   Wound Thickness Description not for Pressure Injury Full thickness 01/05/22 1018   Number of days: 0       Percent of Wound(s)/Ulcer(s) Debrided: 100%    Total Surface Area Debrided:  0.6 sq cm     Bleeding:  Minimal    Hemostasis Achieved:  by pressure    Procedural Pain:  5  / 10     Post Procedural Pain:  5 / 10     Response to treatment:  Well tolerated by patient.      Assessment:     Left ankle wound from pressure exacerbated by smoking, PAD and osteomyelitis    Patient tolerated procedure well and was given proper instruction. The nature of the patient's condition was explained in depth. The patient was informed that their compliance to the treatment plan is paramount to successful healing and prevention of further ulceration and/or infection     Plan:     Treatment Plan:       Treatment Note please see attached Discharge Instructions    Written patient dismissal instructions given to patient and signed by patient or POA. Discharge Instructions         [address], [address] [place] [address] Road  Telephone: [phone]    Discharge Instructions     Important reminders:    1. Increase Protein intake for optimal wound healing  2. No added salt to reduce any swelling  3. If diabetic, maintain good glucose control  4. If you smoke, smoking prohibits wound healing, we ask that you refrain from smoking. 5. When taking antibiotics take the entire prescription as ordered. Do not stop taking until medication is all gone unless otherwise instructed. 6. Exercise as tolerated. 7. Keep weight off wounds and reposition every 2 hours if applicable. 8. If wound(s) is on your lower extremity, elevate legs to the level of the heart or above for 30 minutes 4-5 times a day and/or when sitting. Avoid standing for long periods of time. 9. Do not get wounds wet in bath or shower unless otherwise instructed by your physician. If your wound is on your foot or leg, you may purchase a cast bag. Please ask at the pharmacy. If Vascular testing is ordered, please call [address] [place] [address] ([phone]) to schedule. Vascular tests ordered by Wound Care Physicians may take up to 2 hours to complete. Please keep that in mind when scheduling. If Vascular testing is scheduled, please bring supplies to replace your dressing after testing is done.  The vascular department does not stock supplies. Wound: Left ankle     With each dressing change, rinse wounds with 0.9% Saline. (May use wound wash or soft contact solution. Both can be purchased at a local drug store). If unable to obtain saline, may use a gentle soap and water. Dressing care: Gentamicin cream, dry dressing- change daily. Infectious Disease  Phone: [phone]  Fax: [phone]    Home Care Agency:     Your wound-care supplies will be provided by:   Please note, depending on your insurance coverage, you may have out-of-pocket expenses for these supplies. Someone from the company should call you to confirm your order and discuss those potential costs before they ship your products -- please anticipate that call. If your out-of-pocket cost could be substantial, Many companies have financial hardship programs for patients who qualify, so please ask about that if you might need a hand. If you have any questions about your supplies or your potential out-of-pocket costs, or if you need to place an order for a refill of supplies (typically monthly), please call the company directly. Your  is Leopold Piano    Follow up with Dr Alicja Duncan In 1 week(s) in the wound care center. Wound Care Center Information: Should you experience any significant changes in your wound(s) or have questions about your wound care, please [facility] at [phone] Monday  - Thursday 8:00 am - 4:00 pm and Friday 8:00 am - 1:00pm. If you need help with your wound outside these hours and cannot wait until we are again available, contact your PCP or go to the hospital emergency room. PLEASE NOTE: IF YOU ARE UNABLE TO OBTAIN WOUND SUPPLIES, CONTINUE TO USE THE SUPPLIES YOU HAVE AVAILABLE UNTIL YOU ARE ABLE TO REACH US. IT IS MOST IMPORTANT TO KEEP THE WOUND COVERED AT ALL TIMES. Dany Montano 6  Dirk Beckett MD, FACS  1/5/2022  12:26 PM

## 2022-01-12 ENCOUNTER — HOSPITAL ENCOUNTER (OUTPATIENT)
Dept: WOUND CARE | Age: 77
Discharge: HOME | End: 2022-01-12
Payer: MEDICARE

## 2022-01-12 VITALS
SYSTOLIC BLOOD PRESSURE: 124 MMHG | RESPIRATION RATE: 16 BRPM | TEMPERATURE: 96.8 F | DIASTOLIC BLOOD PRESSURE: 69 MMHG | HEART RATE: 72 BPM

## 2022-01-12 DIAGNOSIS — I70.245 ATHEROSCLEROSIS OF NATIVE ARTERIES OF LEFT LEG WITH ULCERATION OF OTHER PART OF FOOT (HCC): ICD-10-CM

## 2022-01-12 DIAGNOSIS — M86.472 CHRONIC OSTEOMYELITIS OF LEFT ANKLE WITH DRAINING SINUS (HCC): ICD-10-CM

## 2022-01-12 DIAGNOSIS — M86.072 ACUTE HEMATOGENOUS OSTEOMYELITIS OF LEFT ANKLE (HCC): Primary | ICD-10-CM

## 2022-01-12 DIAGNOSIS — L97.324 NON-PRESSURE CHRONIC ULCER OF LEFT ANKLE WITH NECROSIS OF BONE (HCC): ICD-10-CM

## 2022-01-12 PROCEDURE — 11043 DBRDMT MUSC&/FSCA 1ST 20/<: CPT

## 2022-01-12 PROCEDURE — 87070 CULTURE OTHR SPECIMN AEROBIC: CPT

## 2022-01-12 PROCEDURE — 87205 SMEAR GRAM STAIN: CPT

## 2022-01-12 RX ORDER — LIDOCAINE HYDROCHLORIDE 20 MG/ML
JELLY TOPICAL ONCE
Status: CANCELLED | OUTPATIENT
Start: 2022-01-12 | End: 2022-01-12

## 2022-01-12 RX ORDER — LIDOCAINE 40 MG/G
CREAM TOPICAL ONCE
Status: DISCONTINUED | OUTPATIENT
Start: 2022-01-12 | End: 2022-01-13 | Stop reason: HOSPADM

## 2022-01-12 RX ORDER — LIDOCAINE 50 MG/G
OINTMENT TOPICAL ONCE
Status: CANCELLED | OUTPATIENT
Start: 2022-01-12 | End: 2022-01-12

## 2022-01-12 RX ORDER — LIDOCAINE 40 MG/G
CREAM TOPICAL ONCE
Status: CANCELLED | OUTPATIENT
Start: 2022-01-12 | End: 2022-01-12

## 2022-01-12 RX ORDER — GINSENG 100 MG
CAPSULE ORAL ONCE
Status: CANCELLED | OUTPATIENT
Start: 2022-01-12 | End: 2022-01-12

## 2022-01-12 RX ORDER — LIDOCAINE HYDROCHLORIDE 40 MG/ML
SOLUTION TOPICAL ONCE
Status: CANCELLED | OUTPATIENT
Start: 2022-01-12 | End: 2022-01-12

## 2022-01-12 RX ORDER — BETAMETHASONE DIPROPIONATE 0.05 %
OINTMENT (GRAM) TOPICAL ONCE
Status: CANCELLED | OUTPATIENT
Start: 2022-01-12 | End: 2022-01-12

## 2022-01-12 RX ORDER — BACITRACIN, NEOMYCIN, POLYMYXIN B 400; 3.5; 5 [USP'U]/G; MG/G; [USP'U]/G
OINTMENT TOPICAL ONCE
Status: CANCELLED | OUTPATIENT
Start: 2022-01-12 | End: 2022-01-12

## 2022-01-12 RX ORDER — CLOBETASOL PROPIONATE 0.5 MG/G
OINTMENT TOPICAL ONCE
Status: CANCELLED | OUTPATIENT
Start: 2022-01-12 | End: 2022-01-12

## 2022-01-12 RX ORDER — GENTAMICIN SULFATE 1 MG/G
OINTMENT TOPICAL ONCE
Status: CANCELLED | OUTPATIENT
Start: 2022-01-12 | End: 2022-01-12

## 2022-01-12 RX ORDER — BACITRACIN ZINC AND POLYMYXIN B SULFATE 500; 1000 [USP'U]/G; [USP'U]/G
OINTMENT TOPICAL ONCE
Status: CANCELLED | OUTPATIENT
Start: 2022-01-12 | End: 2022-01-12

## 2022-01-12 NOTE — PROGRESS NOTES
[facility]   Progress Note and Procedure Note      Ara Moore  AGE: 64 y.o. GENDER: female  : 1945  TODAY'S DATE:  2022    Subjective:     Chief Complaint   Patient presents with    Wound Check     Left ankle         HISTORY of PRESENT ILLNESS HPI     Ara Moore is a 64 y.o. female who presents today for wound evaluation. History of Wound: Admits to having wound on her leg for months. She states she did not know was a sore until her primary care physician removed a scab. She states that it been hurting in the area for a while. She has had a revascularization with angiogram in December. She states the wound has not gotten any better since then and its remained about the same. She has no other pedal complaints at this time. She is changing the bandage daily with gentamicin cream.  She denies current nausea, vomiting, fever, chills, shortness of breath or chest pain. She admits to smoking a pack of cigarettes daily. She admits to pain when the legs are elevated. She states there is not pain when she is at work or standing.   Wound Pain:  moderate  Severity:  5 / 10   Wound Type:  arterial and refractory osteomyelitis  Modifying Factors:  edema, venous stasis, lymphedema, arterial insufficiency, decreased tissue oxygenation and substance abuse  Associated Signs/Symptoms:  edema, drainage and pain        PAST MEDICAL HISTORY        Diagnosis Date    CAD (coronary artery disease)     Hyperlipidemia     Hypertension     PAD (peripheral artery disease) ([facility] [address]        PAST SURGICAL HISTORY    Past Surgical History:   Procedure Laterality Date    IR ANGXPTA FEM POP W STENT         FAMILY HISTORY    Family History   Problem Relation Age of Onset    Coronary Art Dis Mother        SOCIAL HISTORY    Social History     Tobacco Use    Smoking status: Current Every Day Smoker     Packs/day: 0.50    Smokeless tobacco: Never Used   Vaping Use    Vaping Use: Some days    Substances: Nicotine   Substance Use Topics    Alcohol use: Not Currently    Drug use: Not on file       ALLERGIES    No Known Allergies    MEDICATIONS    Current Outpatient Medications on File Prior to Encounter   Medication Sig Dispense Refill    gentamicin (GARAMYCIN) 0.1 % cream Apply topically daily. 30 g 1    lisinopril-hydroCHLOROthiazide (PRINZIDE;ZESTORETIC) 20-25 MG per tablet TAKE ONE TABLET BY MOUTH EVERY DAY      clopidogrel (PLAVIX) 75 MG tablet Take 75 mg by mouth daily      atorvastatin (LIPITOR) 10 MG tablet TAKE ONE TABLET BY MOUTH EVERY DAY      aspirin 81 MG EC tablet Take 81 mg by mouth daily      Potassium Chloride Jessie ER (KLOR-CON M20 PO) Take by mouth daily       No current facility-administered medications on file prior to encounter. REVIEW OF SYSTEMS    Pertinent items are noted in HPI. Objective:      /69   Pulse 72   Temp 96.8 °F (36 °C) (Infrared)   Resp 16     PHYSICAL EXAM    Vascular: Vascular status Impaired  palpable pedal pulses, right DP0/4 and PT0/4, left DP0/4 and PT0/4. CFT 4 seconds digits 1 to 5 bilateral.  Hair growthAbsent  both lower extremities and feet. Skin temperature is warm to warm from pretibial area to distal digits bilateral.  Exam is negative for rubor, pallor, cyanosis or signs of acute vascular compromise bilaterally. Exam is positive for edema bilateral lower extremity. Varicosities Present bilateral lower extremity. Neuro: Neurologic status normal bilateral with epicritic Present , proprioceptive Present, vibratory sensationPresent and protopathicPresent. DTRs Present bilateral Achilles. There were no reproducible neuritic symptoms on exam bilateral feet/ankles. Derm: Ulceration to left ankle. Ecchymosis Absent  left feet/foot. Musculoskeletal: Pain with palpation and debridement of wound 5/5 muscle strength in/eversion and dorsi/plantarflexion bilateral feet. No gross instability noted.             Assessment: Problem List Items Addressed This Visit     Atherosclerosis of native arteries of left leg with ulceration of other part of foot (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 [address] - Primary    Relevant Medications    lidocaine (LMX) 4 % cream    Chronic osteomyelitis of left ankle with draining sinus ([facility] [address]          Procedure Note    Performed by: No Beard DPM    Consent obtained: Yes    Time out taken:  Yes    Pain Control: Anesthetic  Anesthetic: 4% Lidocaine Cream     Debridement:Excisional Debridement    Using curette the wound was sharply debrided    down through and including the removal of epidermis, dermis, subcutaneous tissue and muscle/fascia.         Devitalized Tissue Debrided:  fibrin, slough, necrotic/eschar and exudate    Pre Debridement Measurements:  Are located in the Wound Documentation Flow Sheet    Wound #: 1     Post  Debridement Measurements:  Wound 01/05/22 Ankle Lateral;Left #1 (Active)   Wound Image   01/05/22 1018   Wound Etiology Arterial 01/12/22 1501   Wound Cleansed Cleansed with saline 01/12/22 1501   Dressing/Treatment Antibacterial ointment;Dry dressing;Triad hydro/zinc oxide-based hydrophilic paste 99/42/15 4099   Wound Length (cm) 0.9 cm 01/12/22 1501   Wound Width (cm) 0.9 cm 01/12/22 1501   Wound Depth (cm) 0.1 cm 01/12/22 1501   Wound Surface Area (cm^2) 0.81 cm^2 01/12/22 1501   Change in Wound Size % (l*w) -35 01/12/22 1501   Wound Volume (cm^3) 0.081 cm^3 01/12/22 1501   Wound Healing % 32 01/12/22 1501   Post-Procedure Length (cm) 0.9 cm 01/12/22 1523   Post-Procedure Width (cm) 0.9 cm 01/12/22 1523   Post-Procedure Depth (cm) 0.1 cm 01/12/22 1523   Post-Procedure Surface Area (cm^2) 0.81 cm^2 01/12/22 1523   Post-Procedure Volume (cm^3) 0.081 cm^3 01/12/22 1523   Wound Assessment Bleeding 01/12/22 1523   Drainage Amount Small 01/12/22 1501   Drainage Description Serosanguinous 01/12/22 1501   Odor None 01/12/22 1501   Tianna-wound Assessment Intact 01/12/22 1501   Margins Attached edges 01/12/22 1501   Wound Thickness Description not for Pressure Injury Full thickness 01/05/22 1018   Number of days: 7           Total Surface Area Debrided:  0.81 sq cm     Percentage of wound debrided 100%    Bleeding:  Minimal    Hemostasis Achieved:  by pressure    Procedural Pain:  4  / 10     Post Procedural Pain:  0 / 10     Response to treatment:  Well tolerated by patient. Plan:   Patient examined and evaluated   Wound sharply abraded without incident   Discussed appropriate diet   Recommend smoking cessation   Wound cultured   Discussed acute versus chronic ischemia of the leg. Given the patient's rest pain and refer back to vascular surgery for any further intervention to improve the blood flow in the leg. The nature of the patient's condition was explained in depth.  The patient was informed that their compliance to the treatment plan is paramount to successful healing and prevention of further ulceration and/or infection     Discharge Treatment Wound 01/05/22 Ankle Lateral;Left #1-Dressing/Treatment: Antibacterial ointment,Dry dressing,Triad hydro/zinc oxide-based hydrophilic paste    Written Patient Discharge Instructions Given            Electronically signed by Mirella Limon DPM on 1/12/2022 at 3:59 PM

## 2022-01-12 NOTE — PLAN OF CARE
Discharge instructions given. Patient verbalized understanding. Return to [facility] in 1 week(s). Pt awaiting call back from Dr Anthony Canavan. Follow up with Irma Webb next month. Dr Ja Odell would like sooner.  Message sent

## 2022-01-15 LAB
GRAM STAIN RESULT: NORMAL
WOUND/ABSCESS: NORMAL

## 2022-01-17 ENCOUNTER — TELEPHONE (OUTPATIENT)
Dept: INFECTIOUS DISEASES | Age: 77
End: 2022-01-17

## 2022-01-17 NOTE — TELEPHONE ENCOUNTER
----- Message from Leroy Baer MD sent at 1/12/2022  4:59 PM EST -----  Regarding: RE: Referral  Ok to schedule. Please ask for  wound center to do a wound culture immediately and get a baseline CBC, CMP, ESR and CRP if patient gets scheduled with me. Thanks.    ----- Message -----  From: Jemma Roberts  Sent: 1/11/2022  10:36 AM EST  To: Leroy Baer MD  Subject: Referral                                         Please review referral in epic and let the office know if pt can be scheduled.

## 2022-01-17 NOTE — TELEPHONE ENCOUNTER
Called and left detailed message on Gabriel Litten (Dr. Cavazos Chamber nurse) informing her that per Dr. Lorena Zarco request pt needed a wound culture (which appears to have been done 1/10/22) but also needs some baseline labs drawn. I have gotten the pt scheduled w/ Dr. Lorena Zarco. I have also provided the pt w/ the name of the labs that she needs done before her appt w/ Dr. Lorena Zarco so she too can make the referring MD aware.

## 2022-01-21 ENCOUNTER — HOSPITAL ENCOUNTER (OUTPATIENT)
Dept: WOUND CARE | Age: 77
Discharge: HOME | End: 2022-01-21
Payer: MEDICARE

## 2022-01-21 ENCOUNTER — HOSPITAL ENCOUNTER (OUTPATIENT)
Age: 77
Discharge: HOME | End: 2022-01-21
Payer: MEDICARE

## 2022-01-21 VITALS
RESPIRATION RATE: 16 BRPM | SYSTOLIC BLOOD PRESSURE: 128 MMHG | HEART RATE: 67 BPM | DIASTOLIC BLOOD PRESSURE: 71 MMHG | TEMPERATURE: 97.1 F

## 2022-01-21 DIAGNOSIS — L97.324 NON-PRESSURE CHRONIC ULCER OF LEFT ANKLE WITH NECROSIS OF BONE (HCC): Primary | ICD-10-CM

## 2022-01-21 LAB
A/G RATIO: 2.2 (ref 1.1–2.2)
ALBUMIN SERPL-MCNC: 4 G/DL (ref 3.4–5)
ALP BLD-CCNC: 57 U/L (ref 40–129)
ALT SERPL-CCNC: 15 U/L (ref 10–40)
ANION GAP SERPL CALCULATED.3IONS-SCNC: 15 MMOL/L (ref 3–16)
AST SERPL-CCNC: 14 U/L (ref 15–37)
BILIRUB SERPL-MCNC: 0.3 MG/DL (ref 0–1)
BUN BLDV-MCNC: 23 MG/DL (ref 7–20)
C-REACTIVE PROTEIN: <3 MG/L (ref 0–5.1)
CALCIUM SERPL-MCNC: 9.3 MG/DL (ref 8.3–10.6)
CHLORIDE BLD-SCNC: 97 MMOL/L (ref 99–110)
CO2: 26 MMOL/L (ref 21–32)
CREAT SERPL-MCNC: 1 MG/DL (ref 0.6–1.2)
GFR AFRICAN AMERICAN: >60
GFR NON-AFRICAN AMERICAN: 54
GLUCOSE BLD-MCNC: 75 MG/DL (ref 70–99)
HCT VFR BLD CALC: 38.7 % (ref 36–48)
HEMOGLOBIN: 12.7 G/DL (ref 12–16)
MCH RBC QN AUTO: 30.2 PG (ref 26–34)
MCHC RBC AUTO-ENTMCNC: 32.7 G/DL (ref 31–36)
MCV RBC AUTO: 92.3 FL (ref 80–100)
PDW BLD-RTO: 14.6 % (ref 12.4–15.4)
PLATELET # BLD: 153 K/UL (ref 135–450)
PMV BLD AUTO: 9.5 FL (ref 5–10.5)
POTASSIUM SERPL-SCNC: 3.7 MMOL/L (ref 3.5–5.1)
RBC # BLD: 4.19 M/UL (ref 4–5.2)
SEDIMENTATION RATE, ERYTHROCYTE: 8 MM/HR (ref 0–30)
SODIUM BLD-SCNC: 138 MMOL/L (ref 136–145)
TOTAL PROTEIN: 5.8 G/DL (ref 6.4–8.2)
WBC # BLD: 5.9 K/UL (ref 4–11)

## 2022-01-21 PROCEDURE — 85027 COMPLETE CBC AUTOMATED: CPT

## 2022-01-21 PROCEDURE — 80053 COMPREHEN METABOLIC PANEL: CPT

## 2022-01-21 PROCEDURE — 11043 DBRDMT MUSC&/FSCA 1ST 20/<: CPT

## 2022-01-21 PROCEDURE — 85652 RBC SED RATE AUTOMATED: CPT

## 2022-01-21 PROCEDURE — 86140 C-REACTIVE PROTEIN: CPT

## 2022-01-21 PROCEDURE — 36415 COLL VENOUS BLD VENIPUNCTURE: CPT

## 2022-01-21 RX ORDER — LIDOCAINE HYDROCHLORIDE 20 MG/ML
JELLY TOPICAL ONCE
Status: CANCELLED | OUTPATIENT
Start: 2022-01-21 | End: 2022-01-21

## 2022-01-21 RX ORDER — LIDOCAINE 40 MG/G
CREAM TOPICAL ONCE
Status: CANCELLED | OUTPATIENT
Start: 2022-01-21 | End: 2022-01-21

## 2022-01-21 RX ORDER — GENTAMICIN SULFATE 1 MG/G
OINTMENT TOPICAL ONCE
Status: CANCELLED | OUTPATIENT
Start: 2022-01-21 | End: 2022-01-21

## 2022-01-21 RX ORDER — LIDOCAINE HYDROCHLORIDE 40 MG/ML
SOLUTION TOPICAL ONCE
Status: CANCELLED | OUTPATIENT
Start: 2022-01-21 | End: 2022-01-21

## 2022-01-21 RX ORDER — BETAMETHASONE DIPROPIONATE 0.05 %
OINTMENT (GRAM) TOPICAL ONCE
Status: CANCELLED | OUTPATIENT
Start: 2022-01-21 | End: 2022-01-21

## 2022-01-21 RX ORDER — LIDOCAINE 50 MG/G
OINTMENT TOPICAL ONCE
Status: CANCELLED | OUTPATIENT
Start: 2022-01-21 | End: 2022-01-21

## 2022-01-21 RX ORDER — CLOBETASOL PROPIONATE 0.5 MG/G
OINTMENT TOPICAL ONCE
Status: CANCELLED | OUTPATIENT
Start: 2022-01-21 | End: 2022-01-21

## 2022-01-21 RX ORDER — BACITRACIN ZINC AND POLYMYXIN B SULFATE 500; 1000 [USP'U]/G; [USP'U]/G
OINTMENT TOPICAL ONCE
Status: CANCELLED | OUTPATIENT
Start: 2022-01-21 | End: 2022-01-21

## 2022-01-21 RX ORDER — LIDOCAINE 40 MG/G
CREAM TOPICAL ONCE
Status: DISCONTINUED | OUTPATIENT
Start: 2022-01-21 | End: 2022-01-22 | Stop reason: HOSPADM

## 2022-01-21 RX ORDER — BACITRACIN, NEOMYCIN, POLYMYXIN B 400; 3.5; 5 [USP'U]/G; MG/G; [USP'U]/G
OINTMENT TOPICAL ONCE
Status: CANCELLED | OUTPATIENT
Start: 2022-01-21 | End: 2022-01-21

## 2022-01-21 RX ORDER — IBUPROFEN 800 MG/1
TABLET ORAL
COMMUNITY
Start: 2021-12-11

## 2022-01-21 RX ORDER — GINSENG 100 MG
CAPSULE ORAL ONCE
Status: CANCELLED | OUTPATIENT
Start: 2022-01-21 | End: 2022-01-21

## 2022-01-21 ASSESSMENT — PAIN DESCRIPTION - ORIENTATION: ORIENTATION: LEFT

## 2022-01-21 ASSESSMENT — PAIN - FUNCTIONAL ASSESSMENT: PAIN_FUNCTIONAL_ASSESSMENT: PREVENTS OR INTERFERES SOME ACTIVE ACTIVITIES AND ADLS

## 2022-01-21 ASSESSMENT — PAIN DESCRIPTION - FREQUENCY: FREQUENCY: CONTINUOUS

## 2022-01-21 ASSESSMENT — PAIN DESCRIPTION - PAIN TYPE: TYPE: CHRONIC PAIN

## 2022-01-21 ASSESSMENT — PAIN DESCRIPTION - PROGRESSION: CLINICAL_PROGRESSION: NOT CHANGED

## 2022-01-21 ASSESSMENT — PAIN DESCRIPTION - ONSET: ONSET: ON-GOING

## 2022-01-21 ASSESSMENT — PAIN SCALES - GENERAL: PAINLEVEL_OUTOF10: 10

## 2022-01-21 ASSESSMENT — PAIN DESCRIPTION - LOCATION: LOCATION: ANKLE

## 2022-01-21 ASSESSMENT — PAIN DESCRIPTION - DESCRIPTORS: DESCRIPTORS: ACHING;CONSTANT;SHARP

## 2022-01-21 NOTE — PLAN OF CARE
Discharge instructions given. Patient verbalized understanding. Return to [facility] in 1 week(s). James Aceves next week.  Labs being drawn today Patient

## 2022-01-25 ENCOUNTER — OFFICE VISIT (OUTPATIENT)
Dept: VASCULAR SURGERY | Age: 77
End: 2022-01-25
Payer: MEDICARE

## 2022-01-25 ENCOUNTER — PROCEDURE VISIT (OUTPATIENT)
Dept: VASCULAR SURGERY | Age: 77
End: 2022-01-25
Payer: MEDICARE

## 2022-01-25 VITALS
SYSTOLIC BLOOD PRESSURE: 148 MMHG | WEIGHT: 99.2 LBS | HEIGHT: 63 IN | DIASTOLIC BLOOD PRESSURE: 80 MMHG | BODY MASS INDEX: 17.58 KG/M2

## 2022-01-25 DIAGNOSIS — I70.213 ATHEROSCLEROSIS OF NATIVE ARTERIES OF EXTREMITIES WITH INTERMITTENT CLAUDICATION, BILATERAL LEGS (HCC): Primary | ICD-10-CM

## 2022-01-25 DIAGNOSIS — I70.213 ATHEROSCLEROSIS OF NATIVE ARTERIES OF EXTREMITIES WITH INTERMITTENT CLAUDICATION, BILATERAL LEGS (HCC): ICD-10-CM

## 2022-01-25 PROCEDURE — 1090F PRES/ABSN URINE INCON ASSESS: CPT | Performed by: SURGERY

## 2022-01-25 PROCEDURE — 99213 OFFICE O/P EST LOW 20 MIN: CPT | Performed by: SURGERY

## 2022-01-25 PROCEDURE — 1036F TOBACCO NON-USER: CPT | Performed by: SURGERY

## 2022-01-25 PROCEDURE — G8400 PT W/DXA NO RESULTS DOC: HCPCS | Performed by: SURGERY

## 2022-01-25 PROCEDURE — 4040F PNEUMOC VAC/ADMIN/RCVD: CPT | Performed by: SURGERY

## 2022-01-25 PROCEDURE — 93925 LOWER EXTREMITY STUDY: CPT | Performed by: SURGERY

## 2022-01-25 PROCEDURE — G8427 DOCREV CUR MEDS BY ELIG CLIN: HCPCS | Performed by: SURGERY

## 2022-01-25 PROCEDURE — 1123F ACP DISCUSS/DSCN MKR DOCD: CPT | Performed by: SURGERY

## 2022-01-25 PROCEDURE — G8419 CALC BMI OUT NRM PARAM NOF/U: HCPCS | Performed by: SURGERY

## 2022-01-25 PROCEDURE — G8484 FLU IMMUNIZE NO ADMIN: HCPCS | Performed by: SURGERY

## 2022-01-25 NOTE — PROGRESS NOTES
[facility])   Vascular Surgery Followup    Referring Provider:  Stephanie Li DO     No chief complaint on file. History of Present Illness:  73-year-old female here today for follow-up with history of left foot ulceration who underwent left SFA intervention December 10, 2021. She presents today for follow-up ultrasound imaging and to discuss the results. Continues to struggle with pain at her ulceration site. No other new complaints. Is working 2 days/week and is on her feet 10 hours/day. She did quit smoking 1 week ago. Past Medical History:   has a past medical history of CAD (coronary artery disease), Hyperlipidemia, Hypertension, and PAD (peripheral artery disease) ([address] Surgical History:   has a past surgical history that includes IR ANGIOPLASTY FEM POP W STENT. Social History:   reports that she quit smoking 11 days ago. She smoked 0.50 packs per day. She has never used smokeless tobacco. She reports previous alcohol use. Family History:  family history includes Coronary Art Dis in her mother. Home Medications:  Current Outpatient Medications   Medication Sig Dispense Refill    ibuprofen (ADVIL;MOTRIN) 800 MG tablet TAKE ONE TABLET BY MOUTH THREE TIMES DAILY      gentamicin (GARAMYCIN) 0.1 % cream Apply topically daily. 30 g 1    lisinopril-hydroCHLOROthiazide (PRINZIDE;ZESTORETIC) 20-25 MG per tablet TAKE ONE TABLET BY MOUTH EVERY DAY      clopidogrel (PLAVIX) 75 MG tablet Take 75 mg by mouth daily      atorvastatin (LIPITOR) 10 MG tablet TAKE ONE TABLET BY MOUTH EVERY DAY      aspirin 81 MG EC tablet Take 81 mg by mouth daily      Potassium Chloride Jessie ER (KLOR-CON M20 PO) Take by mouth daily       No current facility-administered medications for this visit. Allergies:  Patient has no known allergies. Review of Systems:   · Constitutional: there has been no unanticipated weight loss.  There's been no change in energy level, sleep pattern, or activity level.     · Eyes: No visual changes or diplopia. No scleral icterus. · ENT: No Headaches, hearing loss or vertigo. No mouth sores or sore throat. · Cardiovascular: Reviewed in HPI  · Respiratory: No cough or wheezing, no sputum production. No hematemesis. · Gastrointestinal: No abdominal pain, appetite loss, blood in stools. No change in bowel or bladder habits. · Genitourinary: No dysuria, trouble voiding, or hematuria. · Musculoskeletal:  No gait disturbance, weakness or joint complaints. · Integumentary: No rash or pruritis. · Neurological: No headache, diplopia, change in muscle strength, numbness or tingling. No change in gait, balance, coordination, mood, affect, memory, mentation, behavior. · Psychiatric: No anxiety, no depression. · Endocrine: No malaise, fatigue or temperature intolerance. No excessive thirst, fluid intake, or urination. No tremor. · Hematologic/Lymphatic: No abnormal bruising or bleeding, blood clots or swollen lymph nodes. · Allergic/Immunologic: No nasal congestion or hives. Physical Examination:    There were no vitals filed for this visit. General appearance: alert, appears stated age, cooperative and no distress  Left lateral foot ulceration. Palpable popliteal pulse. Monophasic distal signals. MEDICAL DECISION MAKING/TESTING  I have reviewed the testing personally and my interpretation is below. Ultrasound today shows bilateral RON of 0.88. Biphasic waveforms throughout the left lower extremity with occlusion of the anterior tibial artery.     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 No further revascularization options available for anterior tibial or posterior tibial arteries. Her recent intervention is widely patent on duplex imaging today. She did quit smoking a week ago which will be tremendously helpful for her moving forward. Continue with activity. We will plan for follow-up surveillance imaging of her left lower extremity in 3 months      Thank you for allowing me to participate in the care of this individual.  Please do not hesitate to contact me with any questions. Jazz Nixon M.D., FACS.   1/25/2022  10:32 AM

## 2022-01-26 NOTE — PROGRESS NOTES
Substances: Nicotine   Substance Use Topics    Alcohol use: Not Currently    Drug use: Not on file       ALLERGIES    No Known Allergies    MEDICATIONS    Current Outpatient Medications on File Prior to Encounter   Medication Sig Dispense Refill    ibuprofen (ADVIL;MOTRIN) 800 MG tablet TAKE ONE TABLET BY MOUTH THREE TIMES DAILY      gentamicin (GARAMYCIN) 0.1 % cream Apply topically daily. 30 g 1    lisinopril-hydroCHLOROthiazide (PRINZIDE;ZESTORETIC) 20-25 MG per tablet TAKE ONE TABLET BY MOUTH EVERY DAY      clopidogrel (PLAVIX) 75 MG tablet Take 75 mg by mouth daily      atorvastatin (LIPITOR) 10 MG tablet TAKE ONE TABLET BY MOUTH EVERY DAY      aspirin 81 MG EC tablet Take 81 mg by mouth daily      Potassium Chloride Jessie ER (KLOR-CON M20 PO) Take by mouth daily       No current facility-administered medications on file prior to encounter. REVIEW OF SYSTEMS    Pertinent items are noted in HPI. Objective:      /71   Pulse 67   Temp 97.1 °F (36.2 °C) (Infrared)   Resp 16     PHYSICAL EXAM    Vascular: Vascular status Impaired  palpable pedal pulses, right DP0/4 and PT0/4, left DP0/4 and PT0/4. CFT 4 seconds digits 1 to 5 bilateral.  Hair growthAbsent  both lower extremities and feet. Skin temperature is warm to warm from pretibial area to distal digits bilateral.  Exam is negative for rubor, pallor, cyanosis or signs of acute vascular compromise bilaterally. Exam is positive for edema bilateral lower extremity. Varicosities Present bilateral lower extremity. Neuro: Neurologic status normal bilateral with epicritic Present , proprioceptive Present, vibratory sensationPresent and protopathicPresent. DTRs Present bilateral Achilles. There were no reproducible neuritic symptoms on exam bilateral feet/ankles. Derm: Ulceration to left ankle. Ecchymosis Absent  left feet/foot.     Musculoskeletal: Pain with palpation and debridement of wound 5/5 muscle strength in/eversion and dorsi/plantarflexion bilateral feet. No gross instability noted. 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         Devitalized Tissue Debrided:  fibrin, slough, necrotic/eschar and exudate    Pre Debridement Measurements:  Are located in the Wound Documentation Flow Sheet    Wound #: 1     Wound Care Documentation:  Wound 01/05/22 Ankle Lateral;Left #1 (Active)   Wound Image   01/05/22 1018   Wound Etiology Arterial 01/21/22 1117   Wound Cleansed Cleansed with saline 01/21/22 1117   Dressing/Treatment Antibacterial ointment;Triad hydro/zinc oxide-based hydrophilic paste;Dry dressing 01/21/22 1224   Wound Length (cm) 0.7 cm 01/21/22 1117   Wound Width (cm) 0.8 cm 01/21/22 1117   Wound Depth (cm) 0.2 cm 01/21/22 1117   Wound Surface Area (cm^2) 0.56 cm^2 01/21/22 1117   Change in Wound Size % (l*w) 6.67 01/21/22 1117   Wound Volume (cm^3) 0.112 cm^3 01/21/22 1117   Wound Healing % 7 01/21/22 1117   Post-Procedure Length (cm) 0.7 cm 01/21/22 1150   Post-Procedure Width (cm) 0.8 cm 01/21/22 1150   Post-Procedure Depth (cm) 0.2 cm 01/21/22 1150   Post-Procedure Surface Area (cm^2) 0.56 cm^2 01/21/22 1150   Post-Procedure Volume (cm^3) 0.112 cm^3 01/21/22 1150   Wound Assessment [facility] 01/21/22 1150   Drainage Amount Small 01/21/22 1117   Drainage Description Serosanguinous 01/21/22 1117   Odor None 01/21/22 1117   Tianna-wound Assessment Intact 01/21/22 1117   Margins Defined edges 01/21/22 1117   Wound Thickness Description not for Pressure Injury Full thickness 01/05/22 1018   Number of days: 21           Total Surface Area Debrided:  0.56 sq cm     Percentage of wound debrided 100%    Bleeding:  Minimal    Hemostasis Achieved:  by pressure    Procedural Pain:  4  / 10     Post Procedural Pain:  0 / 10     Response to treatment:  Well tolerated by patient. Plan:   Patient examined and evaluated   Wound sharply abraded without incident   Discussed appropriate diet   Continue smoking cessation encouraged cessation of nicotine products  Wound cultured   Discussed acute versus chronic ischemia of the leg. Given the patient's rest pain and refer back to vascular surgery for any further intervention to improve the blood flow in the leg. The nature of the patient's condition was explained in depth.  The patient was informed that their compliance to the treatment plan is paramount to successful healing and prevention of further ulceration and/or infection     Discharge Treatment Wound 01/05/22 Ankle Lateral;Left #1-Dressing/Treatment: Antibacterial ointment,Triad hydro/zinc oxide-based hydrophilic paste,Dry dressing    Written Patient Discharge Instructions Given            Electronically signed by Chinedu Wright DPM on 1/26/2022 at 1:25 PM

## 2022-01-28 ENCOUNTER — HOSPITAL ENCOUNTER (OUTPATIENT)
Dept: WOUND CARE | Age: 77
Discharge: HOME | End: 2022-01-28
Payer: MEDICARE

## 2022-01-28 VITALS
DIASTOLIC BLOOD PRESSURE: 75 MMHG | SYSTOLIC BLOOD PRESSURE: 126 MMHG | RESPIRATION RATE: 16 BRPM | HEART RATE: 68 BPM | TEMPERATURE: 96.8 F

## 2022-01-28 DIAGNOSIS — L97.324 NON-PRESSURE CHRONIC ULCER OF LEFT ANKLE WITH NECROSIS OF BONE (HCC): Primary | ICD-10-CM

## 2022-01-28 PROCEDURE — 11043 DBRDMT MUSC&/FSCA 1ST 20/<: CPT

## 2022-01-28 RX ORDER — LIDOCAINE 50 MG/G
OINTMENT TOPICAL ONCE
Status: CANCELLED | OUTPATIENT
Start: 2022-01-28 | End: 2022-01-28

## 2022-01-28 RX ORDER — CLOBETASOL PROPIONATE 0.5 MG/G
OINTMENT TOPICAL ONCE
Status: CANCELLED | OUTPATIENT
Start: 2022-01-28 | End: 2022-01-28

## 2022-01-28 RX ORDER — LIDOCAINE 40 MG/G
CREAM TOPICAL ONCE
Status: CANCELLED | OUTPATIENT
Start: 2022-01-28 | End: 2022-01-28

## 2022-01-28 RX ORDER — GINSENG 100 MG
CAPSULE ORAL ONCE
Status: CANCELLED | OUTPATIENT
Start: 2022-01-28 | End: 2022-01-28

## 2022-01-28 RX ORDER — BACITRACIN, NEOMYCIN, POLYMYXIN B 400; 3.5; 5 [USP'U]/G; MG/G; [USP'U]/G
OINTMENT TOPICAL ONCE
Status: CANCELLED | OUTPATIENT
Start: 2022-01-28 | End: 2022-01-28

## 2022-01-28 RX ORDER — LIDOCAINE HYDROCHLORIDE 40 MG/ML
SOLUTION TOPICAL ONCE
Status: CANCELLED | OUTPATIENT
Start: 2022-01-28 | End: 2022-01-28

## 2022-01-28 RX ORDER — LIDOCAINE HYDROCHLORIDE 20 MG/ML
JELLY TOPICAL ONCE
Status: CANCELLED | OUTPATIENT
Start: 2022-01-28 | End: 2022-01-28

## 2022-01-28 RX ORDER — BETAMETHASONE DIPROPIONATE 0.05 %
OINTMENT (GRAM) TOPICAL ONCE
Status: CANCELLED | OUTPATIENT
Start: 2022-01-28 | End: 2022-01-28

## 2022-01-28 RX ORDER — BACITRACIN ZINC AND POLYMYXIN B SULFATE 500; 1000 [USP'U]/G; [USP'U]/G
OINTMENT TOPICAL ONCE
Status: CANCELLED | OUTPATIENT
Start: 2022-01-28 | End: 2022-01-28

## 2022-01-28 RX ORDER — GENTAMICIN SULFATE 1 MG/G
OINTMENT TOPICAL ONCE
Status: CANCELLED | OUTPATIENT
Start: 2022-01-28 | End: 2022-01-28

## 2022-01-28 ASSESSMENT — PAIN DESCRIPTION - PAIN TYPE: TYPE: CHRONIC PAIN

## 2022-01-28 ASSESSMENT — PAIN - FUNCTIONAL ASSESSMENT: PAIN_FUNCTIONAL_ASSESSMENT: PREVENTS OR INTERFERES SOME ACTIVE ACTIVITIES AND ADLS

## 2022-01-28 ASSESSMENT — PAIN DESCRIPTION - ORIENTATION: ORIENTATION: LEFT

## 2022-01-28 ASSESSMENT — PAIN SCALES - GENERAL: PAINLEVEL_OUTOF10: 9

## 2022-01-28 ASSESSMENT — PAIN DESCRIPTION - PROGRESSION: CLINICAL_PROGRESSION: NOT CHANGED

## 2022-01-28 ASSESSMENT — PAIN DESCRIPTION - ONSET: ONSET: ON-GOING

## 2022-01-28 ASSESSMENT — PAIN DESCRIPTION - LOCATION: LOCATION: FOOT

## 2022-01-28 ASSESSMENT — PAIN DESCRIPTION - FREQUENCY: FREQUENCY: INTERMITTENT

## 2022-01-28 ASSESSMENT — PAIN DESCRIPTION - DESCRIPTORS: DESCRIPTORS: ACHING;POUNDING;SHOOTING

## 2022-01-28 NOTE — PLAN OF CARE
Discharge instructions given. Patient verbalized understanding. Return to [facility] in 1 week(s).   Appt with Dr Paras Nixon done, Appt with Dr Shirley Castro next week

## 2022-01-28 NOTE — PROGRESS NOTES
[facility]  Progress Note and Procedure Note      Morris Shah  AGE: 64 y.o. GENDER: female  : 1945  TODAY'S DATE:  2022    Subjective:     Chief Complaint   Patient presents with    Wound Check     left lower extremity         HISTORY of PRESENT ILLNESS HPI     Morris Shah is a 64 y.o. female who presents today for wound evaluation. History of Wound: Admits to having wound on her leg for months. She states she did not know was a sore until her primary care physician removed a scab. She states that it been hurting in the area for a while. She has had a revascularization with angiogram in December. Continue did not smoke. She states she followed up with Dr. Forest Small he says that there is nothing else that can be done for her blood flow currently.     Wound Pain:  moderate  Severity:  5 / 10   Wound Type:  arterial and refractory osteomyelitis  Modifying Factors:  edema, venous stasis, lymphedema, arterial insufficiency, decreased tissue oxygenation and substance abuse  Associated Signs/Symptoms:  edema, drainage and pain        PAST MEDICAL HISTORY        Diagnosis Date    CAD (coronary artery disease)     Hyperlipidemia     Hypertension     [address] (peripheral artery disease) ([facility]        PAST SURGICAL HISTORY    Past Surgical History:   Procedure Laterality Date    IR ANGXPTA FEM POP W STENT         FAMILY HISTORY    Family History   Problem Relation Age of Onset    Coronary Art Dis Mother        SOCIAL HISTORY    Social History     Tobacco Use    Smoking status: Former Smoker     Packs/day: 0.50     Quit date: 2022     Years since quittin.0    Smokeless tobacco: Never Used   Vaping Use    Vaping Use: Some days    Substances: Nicotine   Substance Use Topics    Alcohol use: Not Currently    Drug use: Not on file       ALLERGIES    No Known Allergies    MEDICATIONS    Current Outpatient Medications on File Prior to Encounter   Medication Sig Dispense Refill    ibuprofen (ADVIL;MOTRIN) 800 MG tablet TAKE ONE TABLET BY MOUTH THREE TIMES DAILY      gentamicin (GARAMYCIN) 0.1 % cream Apply topically daily. 30 g 1    lisinopril-hydroCHLOROthiazide (PRINZIDE;ZESTORETIC) 20-25 MG per tablet TAKE ONE TABLET BY MOUTH EVERY DAY      clopidogrel (PLAVIX) 75 MG tablet Take 75 mg by mouth daily      atorvastatin (LIPITOR) 10 MG tablet TAKE ONE TABLET BY MOUTH EVERY DAY      aspirin 81 MG EC tablet Take 81 mg by mouth daily      Potassium Chloride Jessie ER (KLOR-CON M20 PO) Take by mouth daily       No current facility-administered medications on file prior to encounter. REVIEW OF SYSTEMS    Pertinent items are noted in HPI. Objective:      /75   Pulse 68   Temp 96.8 °F (36 °C) (Infrared)   Resp 16     PHYSICAL EXAM    Vascular: Vascular status Impaired  palpable pedal pulses, right DP0/4 and PT0/4, left DP0/4 and PT0/4. CFT 4 seconds digits 1 to 5 bilateral.  Hair growthAbsent  both lower extremities and feet. Skin temperature is warm to warm from pretibial area to distal digits bilateral.  Exam is negative for rubor, pallor, cyanosis or signs of acute vascular compromise bilaterally. Exam is positive for edema bilateral lower extremity. Varicosities Present bilateral lower extremity. Neuro: Neurologic status normal bilateral with epicritic Present , proprioceptive Present, vibratory sensationPresent and protopathicPresent. DTRs Present bilateral Achilles. There were no reproducible neuritic symptoms on exam bilateral feet/ankles. Derm: Ulceration to left ankle. Ecchymosis Absent  left feet/foot. Musculoskeletal: Pain with palpation and debridement of wound 5/5 muscle strength in/eversion and dorsi/plantarflexion bilateral feet. No gross instability noted.       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         Devitalized Tissue Debrided:  fibrin, slough, necrotic/eschar and exudate    Pre Debridement Measurements:  Are located in the Wound Documentation Flow Sheet    Wound #: 1     Wound Care Documentation:  Wound 01/05/22 Ankle Lateral;Left #1 (Active)   Wound Image   01/05/22 1018   Wound Etiology Arterial 01/28/22 1114   Wound Cleansed Cleansed with saline 01/28/22 1114   Dressing/Treatment Antibacterial ointment;Triad hydro/zinc oxide-based hydrophilic paste;Dry dressing 01/21/22 1224   Wound Length (cm) 0.7 cm 01/28/22 1114   Wound Width (cm) 0.9 cm 01/28/22 1114   Wound Depth (cm) 0.2 cm 01/28/22 1114   Wound Surface Area (cm^2) 0.63 cm^2 01/28/22 1114   Change in Wound Size % (l*w) -5 01/28/22 1114   Wound Volume (cm^3) 0.126 cm^3 01/28/22 1114   Wound Healing % -5 01/28/22 1114   Post-Procedure Length (cm) 0.7 cm 01/28/22 1148   Post-Procedure Width (cm) 0.9 cm 01/28/22 1148   Post-Procedure Depth (cm) 0.1 cm 01/28/22 1148   Post-Procedure Surface Area (cm^2) 0.63 cm^2 01/28/22 1148   Post-Procedure Volume (cm^3) 0.063 cm^3 01/28/22 1148   Wound Assessment Slough 01/28/22 1148   Drainage Amount Scant 01/28/22 1114   Drainage Description Serous 01/28/22 1114   Odor None 01/28/22 1114   Tianna-wound Assessment Intact 01/28/22 1114   Margins Defined edges 01/28/22 1114   Wound Thickness Description not for Pressure Injury Full thickness 01/05/22 1018   Number of days: 23         Total Surface Area Debrided:  0.63 sq cm     Percentage of wound debrided 100%    Bleeding:  Minimal    Hemostasis Achieved:  by pressure    Procedural Pain:  4  / 10     Post Procedural Pain:  0 / 10     Response to treatment:  Well tolerated by patient. Plan:   Patient examined and evaluated   Wound sharply debrided without incident   Discussed appropriate diet   Continue smoking cessation encouraged cessation of nicotine products  No growth on the wound culture  Discussed acute versus chronic ischemia of the leg. She would benefit from hyperbaric oxygen treatment for her chronic wound and osteomyelitis. The nature of the patient's condition was explained in depth. The patient was informed that their compliance to the treatment plan is paramount to successful healing and prevention of further ulceration and/or infection.     Discharge Treatment  Daily dressing changes    Written Patient Discharge Instructions Given            Electronically signed by Aleda Epley, DPM on 1/28/2022 at 1:25 PM

## 2022-02-08 ENCOUNTER — VIRTUAL VISIT (OUTPATIENT)
Dept: INFECTIOUS DISEASES | Age: 77
End: 2022-02-08
Payer: MEDICARE

## 2022-02-08 DIAGNOSIS — I25.83 CORONARY ARTERY DISEASE DUE TO LIPID RICH PLAQUE: ICD-10-CM

## 2022-02-08 DIAGNOSIS — Z71.6 TOBACCO ABUSE COUNSELING: ICD-10-CM

## 2022-02-08 DIAGNOSIS — I25.10 CORONARY ARTERY DISEASE DUE TO LIPID RICH PLAQUE: ICD-10-CM

## 2022-02-08 DIAGNOSIS — E78.49 OTHER HYPERLIPIDEMIA: ICD-10-CM

## 2022-02-08 DIAGNOSIS — F17.200 SMOKER: ICD-10-CM

## 2022-02-08 DIAGNOSIS — I10 ESSENTIAL HYPERTENSION: ICD-10-CM

## 2022-02-08 DIAGNOSIS — I73.9 PAD (PERIPHERAL ARTERY DISEASE) (HCC): ICD-10-CM

## 2022-02-08 DIAGNOSIS — S91.002A ANKLE WOUND, LEFT, INITIAL ENCOUNTER: Primary | ICD-10-CM

## 2022-02-08 DIAGNOSIS — L98.8 DRAINING CUTANEOUS SINUS TRACT: ICD-10-CM

## 2022-02-08 PROCEDURE — 99203 OFFICE O/P NEW LOW 30 MIN: CPT | Performed by: INTERNAL MEDICINE

## 2022-02-08 PROCEDURE — 1090F PRES/ABSN URINE INCON ASSESS: CPT | Performed by: INTERNAL MEDICINE

## 2022-02-08 PROCEDURE — 4040F PNEUMOC VAC/ADMIN/RCVD: CPT | Performed by: INTERNAL MEDICINE

## 2022-02-08 PROCEDURE — 1123F ACP DISCUSS/DSCN MKR DOCD: CPT | Performed by: INTERNAL MEDICINE

## 2022-02-08 PROCEDURE — G8400 PT W/DXA NO RESULTS DOC: HCPCS | Performed by: INTERNAL MEDICINE

## 2022-02-08 PROCEDURE — G8427 DOCREV CUR MEDS BY ELIG CLIN: HCPCS | Performed by: INTERNAL MEDICINE

## 2022-02-08 ASSESSMENT — ENCOUNTER SYMPTOMS
ABDOMINAL PAIN: 0
RHINORRHEA: 0
CHEST TIGHTNESS: 0
CHOKING: 0
PHOTOPHOBIA: 0
EYE DISCHARGE: 0
FACIAL SWELLING: 0
APNEA: 0
STRIDOR: 0
BLOOD IN STOOL: 0
SHORTNESS OF BREATH: 0
TROUBLE SWALLOWING: 0
COLOR CHANGE: 0
COUGH: 0
NAUSEA: 0
EYE REDNESS: 0
DIARRHEA: 0

## 2022-02-08 NOTE — PROGRESS NOTES
[facility] NewPatient Note    Reason for Visit:              Left ankle osteomyelitis  Primary Care Physician:  Derek GREEN DO  History Obtained From:   Patient , Medical Records     CHIEF COMPLAINT:    Chief Complaint   Patient presents with    New Patient     Osteomyelitis of left ankle       HISTORY OF PRESENT ILLNESS: Kathrin Schlatter is a 64 y.o. pleasant female patient, who had a virtual visit with me today as a new patient. History was obtained from chart review and the patient. The patient had a virtual visit with me today for above mentioned complaints. The patient has longstanding wound on the left ankle and has been following up with the Dr. Raymond Cantrell at the wound care center for that. Dr. Raymond Cantrell is concerned about chronic osteomyelitis of the left ankle and development of a draining sinus tract. The patient was referred to me and was seen today via virtual visit. Past Medical History:   Past medical  history wasreviewed by me during this visit in detail. Past Medical History:   Diagnosis Date    CAD (coronary artery disease)     Hyperlipidemia     Hypertension     PAD (peripheral artery disease) (HCC)        Past Surgical History:  Past surgical history was reviewed by me during this visit in detail. Past Surgical History:   Procedure Laterality Date    IR ANGXPTA FEM POP W STENT         Current Medications: All medicationswere reviewed by me during this visit  Current Outpatient Medications   Medication Sig Dispense Refill    ibuprofen (ADVIL;MOTRIN) 800 MG tablet TAKE ONE TABLET BY MOUTH THREE TIMES DAILY      gentamicin (GARAMYCIN) 0.1 % cream Apply topically daily.  30 g 1    lisinopril-hydroCHLOROthiazide (PRINZIDE;ZESTORETIC) 20-25 MG per tablet TAKE ONE TABLET BY MOUTH EVERY DAY      clopidogrel (PLAVIX) 75 MG tablet Take 75 mg by mouth daily      atorvastatin (LIPITOR) 10 MG tablet TAKE ONE TABLET BY MOUTH EVERY DAY      aspirin 81 MG EC tablet Take 81 mg by mouth daily      Potassium Chloride Jessie ER (KLOR-CON M20 PO) Take by mouth daily       No current facility-administered medications for this visit. Family history: All family history was reviewed by me today    Family History   Problem Relation Age of Onset    Coronary Art Dis Mother        No family history of immunocompromising or autoimmune conditions. No h/o TBin in family or contacts      REVIEW OF SYSTEMS:      Review of Systems   Constitutional: Negative for chills, diaphoresis and unexpected weight change. HENT: Negative for congestion, ear discharge, ear pain, facial swelling, hearing loss, rhinorrhea and trouble swallowing. Eyes: Negative for photophobia, discharge, redness and visual disturbance. Respiratory: Negative for apnea, cough, choking, chest tightness, shortness of breath and stridor. Cardiovascular: Negative for chest pain and palpitations. Gastrointestinal: Negative for abdominal pain, blood in stool, diarrhea and nausea. Endocrine: Negative for polydipsia, polyphagia and polyuria. Genitourinary: Negative for difficulty urinating, dysuria, frequency, hematuria, menstrual problem and vaginal discharge. Musculoskeletal: Negative for arthralgias, joint swelling, myalgias and neck stiffness. Skin: Negative for color change and rash. Chronic wound on the lateral side of the left ankle   Allergic/Immunologic: Negative for immunocompromised state. Neurological: Negative for dizziness, seizures, speech difficulty, light-headedness and headaches. Hematological: Negative for adenopathy. Psychiatric/Behavioral: Negative for agitation, hallucinations and suicidal ideas. PHYSICAL EXAM:      There were no vitals filed for this visit. Physical Exam       Patient is a video would not work, no physical exam could be done.   Wound pictures reviewed from the chart in epic              DATA:  All available lab data was reviewed by me during this visit    Last CBC:  Lab Results   Component Value Date    WBC 5.9 01/21/2022    HGB 12.7 01/21/2022    HCT 38.7 01/21/2022    MCV 92.3 01/21/2022     01/21/2022    RBC 4.19 01/21/2022    MCH 30.2 01/21/2022    MCHC 32.7 01/21/2022    RDW 14.6 01/21/2022       Last BMP:  Lab Results   Component Value Date     01/21/2022    K 3.7 01/21/2022    K 2.9 12/10/2021    CL 97 01/21/2022    CO2 26 01/21/2022    BUN 23 01/21/2022    CREATININE 1.0 01/21/2022    GLUCOSE 75 01/21/2022    CALCIUM 9.3 01/21/2022        Hepatic Function Panel:   Lab Results   Component Value Date    ALKPHOS 57 01/21/2022    ALT 15 01/21/2022    AST 14 01/21/2022    PROT 5.8 01/21/2022    BILITOT 0.3 01/21/2022    LABALBU 4.0 01/21/2022       Last CK: No results found for: CKTOTAL    Last ESR:  Lab Results   Component Value Date    SEDRATE 8 01/21/2022       Last CRP:  Lab Results   Component Value Date    CRP <3.0 01/21/2022       1. Imaging: All pertinent images and reports for the current visit were reviewed by me during this visit. I reviewed the chest x-ray/CT scan/MRI images and independently interpreted the findings and results today. Outside records:    Labs, Microbiology, Radiology and pertinentresults from Care everywhere, if available, were reviewed as a part of the consultation.     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 Immunizations: All immunizations were reviewed byme in detail. Immunization History   Administered Date(s) Administered    COVID-19, Pfizer Purple top, DILUTE for use, 12+ yrs, 30mcg/0.3mL dose 2021, 2021, 2021       Social History:  Allsocial and epidemiologic history was reviewed and updated be me in detail today. Social History     Socioeconomic History    Marital status:      Spouse name: Not on file    Number of children: Not on file    Years of education: Not on file    Highest education level: Not on file   Occupational History    Not on file   Tobacco Use    Smoking status: Former Smoker     Packs/day: 0.50     Quit date: 2022     Years since quittin.0    Smokeless tobacco: Never Used   Vaping Use    Vaping Use: Some days    Substances: Nicotine   Substance and Sexual Activity    Alcohol use: Not Currently    Drug use: Not on file    Sexual activity: Not on file   Other Topics Concern    Not on file   Social History Narrative    Not on file     Social Determinants of Health     Financial Resource Strain:     Difficulty of Paying Living Expenses: Not on file   Food Insecurity:     Worried About Running Out of Food in the Last Year: Not on file    Efrain of Food in the Last Year: Not on file   Transportation Needs:     Lack of Transportation (Medical): Not on file    Lack of Transportation (Non-Medical):  Not on file   Physical Activity:     Days of Exercise per Week: Not on file    Minutes of Exercise per Session: Not on file   Stress:     Feeling of Stress : Not on file   Social Connections:     Frequency of Communication with Friends and Family: Not on file    Frequency of Social Gatherings with Friends and Family: Not on file    Attends Advent Services: Not on file    Active Member of Clubs or Organizations: Not on file    Attends Club or Organization Meetings: Not on file    Marital Status: Not on file   Intimate Partner Violence:     Fear of Current or Ex-Partner: Not on file    Emotionally Abused: Not on file    Physically Abused: Not on file    Sexually Abused: Not on file   Housing Stability:     Unable to Pay for Housing in the Last Year: Not on file    Number of [place] in the Last Year: Not on file    Unstable Housing in the Last Year: Not on file       [address]  LAB RESULT RECORDS:     Internal Administration   First Dose COVID-19, Pfizer Purple top, DILUTE for use, 12+ yrs, 30mcg/0.3mL dose  03/02/2021   Second Dose COVID-19, Pfizer Purple top, DILUTE for use, 12+ yrs, 30mcg/0.3mL dose   03/23/2021       Last COVID Lab No results found for: SARS-COV-2, SARS-COV-2 RNA, SARS-COV-2, SARS-COV-2, SARS-COV-2 BY PCR, SARS-COV-2, SARS-COV-2, SARS-COV-2         IMPRESSION:    The patient is a 64 y. o.old female who  has a past medical history of CAD (coronary artery disease), Hyperlipidemia, Hypertension, and PAD (peripheral artery disease) ([address] was seen today for following problems:    1. Ankle wound, left, initial encounter    2. Draining cutaneous sinus tract    3. PAD (peripheral artery disease) ([address]    4. Essential hypertension    5. Coronary artery disease due to lipid rich plaque    6. Other hyperlipidemia    7. Smoker    8. Tobacco abuse counseling        Discussion:      I reviewed all of the medical record for the patient in detail in [facility] and Care Everywhere. She has been referred to me for concerns for chronic left ankle osteomyelitis with sinus tach. The patient had a left ankle wound culture done at the wound care center on January 12, 2022 by Dr. Imer James. Interestingly, Gram stain the culture remained negative. The patient had an MRI of the left ankle done at [facility] on January 4, 2022. The MRI was reportedly concerning for distal tibia early osteomyelitis.     Her last set of labs was done on January 21, 2022. Her white cell count was 5900. Her sed rate was only 8. Serum creatinine was 1.0 and CRP was less than 3    Although the MRI was concerning for osteomyelitis, normal inflammation markers and negative left ankle wound culture do not seem to be consistent with the diagnosis of osteomyelitis. She had bilateral lower extremity arterial Doppler study done on January 25, 2022. She was noted to have mild arterial insufficiency    She has been using topical gentamicin ointment that was prescribed by Dr. Erick Bhatt on January 5, 2022    RECOMMENDATIONS:      Diagnostic Workup:    · No labs needed today  · Continue to follow fever curve at home. Antimicrobials:    · The patient had negative Gram stain and culture from the left ankle wound in January 12, 2022. · White cell count, sed rate and CRP were perfectly normal on January 21, 2022  · Although the MRI done for the left ankle wound at [facility] on January 4 was concerning for possibility of early osteomyelitis of the left lower tibia, above lab findings are not suggestive of infection. The patient tells me that the wound remains dry and does not drain or bleed at all. She thinks it started from a poorly fitted shoe  · MRI is a very sensitive study and can  any small changes in the bone  · I am not very convinced that patient has osteomyelitis. However, if podiatry would like to try, a 4-week course of oral doxycycline 100 mg every 12 hour can be tried to see if that helps  · If the patient continues to have left ankle nonhealing wound, a biopsy of the area for surgical pathology and cultures would be suggested  · The patient has been a long-term smoker. She has been smoking just below a pack a day for around 40 years. He does tell me that she has quit for past 4 weeks.   I have encouraged her to stay away from smoking, since it also hinders wound healing  · The patient is otherwise nondiabetic  · Discussed with patient the strategies to stay safe from COVID 19 exposures including safe social distancing, frequent and proper hand hygiene when outside and using 3 layered mouth and nose coverings/facemasks when outside their home. Patient education and counseling:    · The patient was educated in detail about the side-effects of variousantibiotics and things to watch for like new rashes, lip swelling, severe reaction, worsening diarrhea, break through fever etc.  · Patient was instructed to stop antibiotics and callour office if these problems were to occur, or go to nearest ER if experiencing severe symptoms. · Discussed patient's condition and what to expect. All of the patient's questions wereaddressed in a satisfactory manner and patient verbalized understanding all instructions. Smoking cessation/ Tobacco use disorder counseling:    I have counseled the patient extensively about risks of smoking and have encouraged the patient to maintain a healthy smoke-free lifestyle. Information was given about various smoking cessation programs and their websites like www.smokefree.gov, [place]. [employer]. [employer] as well as help lines like 1-800FastrQUIT-NOW and 1-800-LUNG-[place]. Follow-up:    · Follow-up with me in ID clinic or through video visit as needed    Charles Chao is a 64 y.o. female being evaluated by a Virtual Visit encounter to address concerns as mentioned above. A caregiver was present when appropriate. Due to this being a TeleHealth encounter (During AXGLD-49 public health emergency), evaluation of the following organ systems was limited: Vitals/Constitutional/EENT/Resp/CV/GI//MS/Neuro/Skin/Heme-Lymph-Imm.   Pursuant to the emergency declaration under the [address] [place] [address], [address] [place] [address] authority and the [employer] Act, this Virtual Visit was conducted with patient's (and/or legal guardian's) consent, to reduce the patient's risk of exposure to COVID-19 and provide necessary medical care. The patient (and/or legal guardian) has also been advised to contact this office for worsening conditions or problems, and seek emergency medical treatment and/or call 911 if deemed necessary. Services were provided through an audio and/or video synchronous discussion virtually to substitute for in-person clinic visit. Patient and provider were located at their individual homes. --Calvin Stephens MD on 2/8/2022 at 10:42 AM    An electronic signature was used to authenticate this note. TIME SPENTTODAY:     - Spent over 32 minutes on visit (including interval history,  review of data including labs, cultures, imaging, development and implementation of treatmentplan and coordination of care). - Over 50% of time spent with pt counseling and education. Please note that this chart was generated using Dragon dictation software. Although every effort was made to ensure the accuracy of this automated transcription, some errors in transcription may have occurred inadvertently. If you may need any clarification, please do not hesitate to contact me through EPIC or at the phone number provided below with my electronic signature. Any pictures or media included in this note were obtained after taking informed verbal consent from the patient and with their approval to include those in the patient's medical record. Thankyou for involving me in the care of your patient. If you have any additional questions,please do not hesitate to contact me.       Calvin Stephens MD, MPH, [address] [place] [address]  2/8/2022, 10:42 AM  [facility]   [address] [place] [address], [address] [place] [address]  Office: [phone]  Fax: [phone]  Clinic days:  Tuesday & Thursday

## 2022-02-11 ENCOUNTER — HOSPITAL ENCOUNTER (OUTPATIENT)
Dept: WOUND CARE | Age: 77
Discharge: HOME | End: 2022-02-11
Payer: MEDICARE

## 2022-02-11 VITALS
HEART RATE: 73 BPM | TEMPERATURE: 97.3 F | RESPIRATION RATE: 16 BRPM | SYSTOLIC BLOOD PRESSURE: 149 MMHG | DIASTOLIC BLOOD PRESSURE: 76 MMHG

## 2022-02-11 DIAGNOSIS — L97.324 NON-PRESSURE CHRONIC ULCER OF LEFT ANKLE WITH NECROSIS OF BONE (HCC): Primary | ICD-10-CM

## 2022-02-11 PROCEDURE — 11043 DBRDMT MUSC&/FSCA 1ST 20/<: CPT

## 2022-02-11 RX ORDER — LIDOCAINE HYDROCHLORIDE 40 MG/ML
SOLUTION TOPICAL ONCE
Status: CANCELLED | OUTPATIENT
Start: 2022-02-11 | End: 2022-02-11

## 2022-02-11 RX ORDER — LIDOCAINE HYDROCHLORIDE 20 MG/ML
JELLY TOPICAL ONCE
Status: CANCELLED | OUTPATIENT
Start: 2022-02-11 | End: 2022-02-11

## 2022-02-11 RX ORDER — BETAMETHASONE DIPROPIONATE 0.05 %
OINTMENT (GRAM) TOPICAL ONCE
Status: CANCELLED | OUTPATIENT
Start: 2022-02-11 | End: 2022-02-11

## 2022-02-11 RX ORDER — CLOBETASOL PROPIONATE 0.5 MG/G
OINTMENT TOPICAL ONCE
Status: CANCELLED | OUTPATIENT
Start: 2022-02-11 | End: 2022-02-11

## 2022-02-11 RX ORDER — GINSENG 100 MG
CAPSULE ORAL ONCE
Status: CANCELLED | OUTPATIENT
Start: 2022-02-11 | End: 2022-02-11

## 2022-02-11 RX ORDER — LIDOCAINE 50 MG/G
OINTMENT TOPICAL ONCE
Status: CANCELLED | OUTPATIENT
Start: 2022-02-11 | End: 2022-02-11

## 2022-02-11 RX ORDER — BACITRACIN ZINC AND POLYMYXIN B SULFATE 500; 1000 [USP'U]/G; [USP'U]/G
OINTMENT TOPICAL ONCE
Status: CANCELLED | OUTPATIENT
Start: 2022-02-11 | End: 2022-02-11

## 2022-02-11 RX ORDER — BACITRACIN, NEOMYCIN, POLYMYXIN B 400; 3.5; 5 [USP'U]/G; MG/G; [USP'U]/G
OINTMENT TOPICAL ONCE
Status: CANCELLED | OUTPATIENT
Start: 2022-02-11 | End: 2022-02-11

## 2022-02-11 RX ORDER — GENTAMICIN SULFATE 1 MG/G
OINTMENT TOPICAL ONCE
Status: CANCELLED | OUTPATIENT
Start: 2022-02-11 | End: 2022-02-11

## 2022-02-11 RX ORDER — LIDOCAINE 40 MG/G
CREAM TOPICAL ONCE
Status: CANCELLED | OUTPATIENT
Start: 2022-02-11 | End: 2022-02-11

## 2022-02-11 RX ORDER — LIDOCAINE 40 MG/G
CREAM TOPICAL ONCE
Status: DISCONTINUED | OUTPATIENT
Start: 2022-02-11 | End: 2022-02-12 | Stop reason: HOSPADM

## 2022-02-11 NOTE — PROGRESS NOTES
[facility]   Progress Note and Procedure Note      Karen Gilbert  AGE: 64 y.o. GENDER: female  : 1945  TODAY'S DATE:  2022    Subjective:     Chief Complaint   Patient presents with    Wound Check     left lower extremity         HISTORY of PRESENT ILLNESS HPI     Karen Gilbert is a 64 y.o. female who presents today for wound evaluation. History of Wound: Admits to having wound on her leg for months. She states she did not know was a sore until her primary care physician removed a scab. She states that it been hurting in the area for a while. She has had a revascularization with angiogram in December. She is continuing to not smoke. She is changing her bandage as directed. She feels the felt is helping.      Wound Pain:  moderate  Severity: 4 / 10   Wound Type:  arterial and refractory osteomyelitis  Modifying Factors:  edema, venous stasis, lymphedema, arterial insufficiency, decreased tissue oxygenation and substance abuse  Associated Signs/Symptoms:  edema, drainage and pain        PAST MEDICAL HISTORY        Diagnosis Date    CAD (coronary artery disease)     Hyperlipidemia     Hypertension     PAD (peripheral artery disease) ([facility]        PAST SURGICAL HISTORY    Past Surgical History:   Procedure Laterality Date    IR ANGXPTA FEM POP W STENT         FAMILY HISTORY    Family History   Problem Relation Age of Onset    Coronary Art Dis Mother        SOCIAL HISTORY    Social History     Tobacco Use    Smoking status: Former Smoker     Packs/day: 0.50     Quit date: 2022     Years since quittin.0    Smokeless tobacco: Never Used   Vaping Use    Vaping Use: Some days    Substances: Nicotine   Substance Use Topics    Alcohol use: Not Currently    Drug use: Not on file       ALLERGIES    No Known Allergies    MEDICATIONS    Current Outpatient Medications on File Prior to Encounter   Medication Sig Dispense Refill    ibuprofen (ADVIL;MOTRIN) 800 MG tablet TAKE ONE TABLET BY MOUTH THREE TIMES DAILY      gentamicin (GARAMYCIN) 0.1 % cream Apply topically daily. 30 g 1    lisinopril-hydroCHLOROthiazide (PRINZIDE;ZESTORETIC) 20-25 MG per tablet TAKE ONE TABLET BY MOUTH EVERY DAY      clopidogrel (PLAVIX) 75 MG tablet Take 75 mg by mouth daily      atorvastatin (LIPITOR) 10 MG tablet TAKE ONE TABLET BY MOUTH EVERY DAY      aspirin 81 MG EC tablet Take 81 mg by mouth daily      Potassium Chloride Jessie ER (KLOR-CON M20 PO) Take by mouth daily       No current facility-administered medications on file prior to encounter. REVIEW OF SYSTEMS    Pertinent items are noted in HPI. Objective:      BP (!) 149/76   Pulse 73   Temp 97.3 °F (36.3 °C) (Infrared)   Resp 16     PHYSICAL EXAM    Vascular: Vascular status Impaired  palpable pedal pulses, right DP0/4 and PT0/4, left DP0/4 and PT0/4. CFT 4 seconds digits 1 to 5 bilateral.  Hair growthAbsent  both lower extremities and feet. Skin temperature is warm to warm from pretibial area to distal digits bilateral.  Exam is negative for rubor, pallor, cyanosis or signs of acute vascular compromise bilaterally. Exam is positive for edema bilateral lower extremity. Varicosities Present bilateral lower extremity. Neuro: Neurologic status normal bilateral with epicritic Present , proprioceptive Present, vibratory sensationPresent and protopathicPresent. DTRs Present bilateral Achilles. There were no reproducible neuritic symptoms on exam bilateral feet/ankles. Derm: Ulceration to left ankle. Ecchymosis Absent  left feet/foot. Musculoskeletal: Pain with palpation and debridement of wound 5/5 muscle strength in/eversion and dorsi/plantarflexion bilateral feet. No gross instability noted.       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         Devitalized Tissue Debrided:  fibrin, slough, necrotic/eschar and exudate    Pre Debridement Measurements:  Are located in the Wound Documentation Flow Sheet    Wound #: 1     Wound Care Documentation:  Wound 01/05/22 Ankle Lateral;Left #1 (Active)   Wound Image   01/05/22 1018   Wound Etiology Arterial 02/11/22 1149   Wound Cleansed Cleansed with saline 02/11/22 1149   Dressing/Treatment Antibacterial ointment;Triad hydro/zinc oxide-based hydrophilic paste;Dry dressing 01/21/22 1224   Wound Length (cm) 0.7 cm 02/11/22 1149   Wound Width (cm) 0.5 cm 02/11/22 1149   Wound Depth (cm) 0.3 cm 02/11/22 1149   Wound Surface Area (cm^2) 0.35 cm^2 02/11/22 1149   Change in Wound Size % (l*w) 41.67 02/11/22 1149   Wound Volume (cm^3) 0.105 cm^3 02/11/22 1149   Wound Healing % 12 02/11/22 1149   Post-Procedure Length (cm) 0.7 cm 02/11/22 1231   Post-Procedure Width (cm) 0.5 cm 02/11/22 1231   Post-Procedure Depth (cm) 0.3 cm 02/11/22 1231   Post-Procedure Surface Area (cm^2) 0.35 cm^2 02/11/22 1231   Post-Procedure Volume (cm^3) 0.105 cm^3 02/11/22 1231   Wound Assessment Granulation tissue;Slough 02/11/22 1231   Drainage Amount Moderate 02/11/22 1149   Drainage Description Serous 02/11/22 1149   Odor None 02/11/22 1149   Tianna-wound Assessment Intact 01/28/22 1114   Margins Defined edges 02/11/22 1149   Wound Thickness Description not for Pressure Injury Full thickness 01/05/22 1018   Number of days: 37         Total Surface Area Debrided:  0.35 sq cm     Percentage of wound debrided 100%    Bleeding: Minimal    Hemostasis Achieved:  by pressure    Procedural Pain:  4  / 10     Post Procedural Pain:  0 / 10     Response to treatment:  Well tolerated by patient. Plan:   Patient examined and evaluated   Relation tissue noted today. Wound sharply debrided without incident   Discussed appropriate diet   Continue smoking cessation encouraged cessation of nicotine products  No growth on the wound culture  Discussed acute versus chronic ischemia of the leg. She would benefit from hyperbaric oxygen treatment for her chronic wound and osteomyelitis. The nature of the patient's condition was explained in depth. The patient was informed that their compliance to the treatment plan is paramount to successful healing and prevention of further ulceration and/or infection.     Discharge Treatment  Daily dressing changes    Written Patient Discharge Instructions Given            Electronically signed by Nell Stevenson DPM on 2/11/2022 at 1:07 PM

## 2022-02-11 NOTE — PLAN OF CARE
Discharge instructions given. Patient verbalized understanding. Return to [address] [place] [address],[address] in 1 week(s).

## 2022-02-25 ENCOUNTER — HOSPITAL ENCOUNTER (OUTPATIENT)
Dept: WOUND CARE | Age: 77
Discharge: HOME | End: 2022-02-25
Payer: MEDICARE

## 2022-02-25 VITALS
TEMPERATURE: 97.5 F | DIASTOLIC BLOOD PRESSURE: 70 MMHG | SYSTOLIC BLOOD PRESSURE: 129 MMHG | RESPIRATION RATE: 16 BRPM | HEART RATE: 65 BPM

## 2022-02-25 DIAGNOSIS — M86.472 CHRONIC OSTEOMYELITIS OF LEFT ANKLE WITH DRAINING SINUS (HCC): ICD-10-CM

## 2022-02-25 DIAGNOSIS — L97.324 NON-PRESSURE CHRONIC ULCER OF LEFT ANKLE WITH NECROSIS OF BONE (HCC): Primary | ICD-10-CM

## 2022-02-25 PROCEDURE — 87070 CULTURE OTHR SPECIMN AEROBIC: CPT

## 2022-02-25 PROCEDURE — 87106 FUNGI IDENTIFICATION YEAST: CPT

## 2022-02-25 PROCEDURE — 11043 DBRDMT MUSC&/FSCA 1ST 20/<: CPT

## 2022-02-25 PROCEDURE — 87205 SMEAR GRAM STAIN: CPT

## 2022-02-25 RX ORDER — BACITRACIN ZINC AND POLYMYXIN B SULFATE 500; 1000 [USP'U]/G; [USP'U]/G
OINTMENT TOPICAL ONCE
Status: CANCELLED | OUTPATIENT
Start: 2022-02-25 | End: 2022-02-25

## 2022-02-25 RX ORDER — GINSENG 100 MG
CAPSULE ORAL ONCE
Status: CANCELLED | OUTPATIENT
Start: 2022-02-25 | End: 2022-02-25

## 2022-02-25 RX ORDER — LIDOCAINE 50 MG/G
OINTMENT TOPICAL ONCE
Status: CANCELLED | OUTPATIENT
Start: 2022-02-25 | End: 2022-02-25

## 2022-02-25 RX ORDER — BETAMETHASONE DIPROPIONATE 0.05 %
OINTMENT (GRAM) TOPICAL ONCE
Status: CANCELLED | OUTPATIENT
Start: 2022-02-25 | End: 2022-02-25

## 2022-02-25 RX ORDER — BACITRACIN, NEOMYCIN, POLYMYXIN B 400; 3.5; 5 [USP'U]/G; MG/G; [USP'U]/G
OINTMENT TOPICAL ONCE
Status: CANCELLED | OUTPATIENT
Start: 2022-02-25 | End: 2022-02-25

## 2022-02-25 RX ORDER — LIDOCAINE HYDROCHLORIDE 20 MG/ML
JELLY TOPICAL ONCE
Status: CANCELLED | OUTPATIENT
Start: 2022-02-25 | End: 2022-02-25

## 2022-02-25 RX ORDER — CLOBETASOL PROPIONATE 0.5 MG/G
OINTMENT TOPICAL ONCE
Status: CANCELLED | OUTPATIENT
Start: 2022-02-25 | End: 2022-02-25

## 2022-02-25 RX ORDER — LIDOCAINE 40 MG/G
CREAM TOPICAL ONCE
Status: CANCELLED | OUTPATIENT
Start: 2022-02-25 | End: 2022-02-25

## 2022-02-25 RX ORDER — LIDOCAINE HYDROCHLORIDE 40 MG/ML
SOLUTION TOPICAL ONCE
Status: CANCELLED | OUTPATIENT
Start: 2022-02-25 | End: 2022-02-25

## 2022-02-25 RX ORDER — GENTAMICIN SULFATE 1 MG/G
OINTMENT TOPICAL ONCE
Status: CANCELLED | OUTPATIENT
Start: 2022-02-25 | End: 2022-02-25

## 2022-02-25 RX ORDER — LIDOCAINE 40 MG/G
CREAM TOPICAL ONCE
Status: DISCONTINUED | OUTPATIENT
Start: 2022-02-25 | End: 2022-02-26 | Stop reason: HOSPADM

## 2022-02-25 ASSESSMENT — PAIN SCALES - GENERAL: PAINLEVEL_OUTOF10: 4

## 2022-02-25 NOTE — PROGRESS NOTES
File Prior to Encounter   Medication Sig Dispense Refill    ibuprofen (ADVIL;MOTRIN) 800 MG tablet TAKE ONE TABLET BY MOUTH THREE TIMES DAILY      gentamicin (GARAMYCIN) 0.1 % cream Apply topically daily. 30 g 1    lisinopril-hydroCHLOROthiazide (PRINZIDE;ZESTORETIC) 20-25 MG per tablet TAKE ONE TABLET BY MOUTH EVERY DAY      clopidogrel (PLAVIX) 75 MG tablet Take 75 mg by mouth daily      atorvastatin (LIPITOR) 10 MG tablet TAKE ONE TABLET BY MOUTH EVERY DAY      aspirin 81 MG EC tablet Take 81 mg by mouth daily      Potassium Chloride Jessie ER (KLOR-CON M20 PO) Take by mouth daily       No current facility-administered medications on file prior to encounter. REVIEW OF SYSTEMS    Pertinent items are noted in HPI. Objective:      /70   Pulse 65   Temp 97.5 °F (36.4 °C) (Infrared)   Resp 16     PHYSICAL EXAM    Vascular: Vascular status Impaired  palpable pedal pulses, right DP0/4 and PT0/4, left DP0/4 and PT0/4. CFT 4 seconds digits 1 to 5 bilateral.  Hair growthAbsent  both lower extremities and feet. Skin temperature is warm to warm from pretibial area to distal digits bilateral.  Exam is negative for rubor, pallor, cyanosis or signs of acute vascular compromise bilaterally. Exam is positive for edema bilateral lower extremity. Varicosities Present bilateral lower extremity. Neuro: Neurologic status normal bilateral with epicritic Present , proprioceptive Present, vibratory sensationPresent and protopathicPresent. DTRs Present bilateral Achilles. There were no reproducible neuritic symptoms on exam bilateral feet/ankles. Derm: Ulceration to left ankle. Ecchymosis Absent  left feet/foot. Musculoskeletal: Pain with palpation and debridement of wound 5/5 muscle strength in/eversion and dorsi/plantarflexion bilateral feet. No gross instability noted.       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 ([facility] [address] - Primary    Relevant Medications    lidocaine (LMX) 4 % cream    Other Relevant Orders    Initiate Outpatient Wound Care Protocol    MRI ANKLE LEFT WO CONTRAST    Chronic osteomyelitis of left ankle with draining sinus ([facility] [address]    Relevant Orders    MRI ANKLE LEFT WO CONTRAST          Procedure Note    Performed by: Demetrius James DPM    Consent obtained: Yes    Time out taken:  Yes    Pain Control: Anesthetic  Anesthetic: 4% Lidocaine Cream     Debridement:Excisional Debridement    Using curette the wound was sharply debrided    down through and including the removal of epidermis, dermis, subcutaneous tissue and muscle/fascia. Devitalized Tissue Debrided:  fibrin, slough, necrotic/eschar and exudate    Pre Debridement Measurements:  Are located in the Wound Documentation Flow Sheet    Wound #: 1     Wound Care Documentation:  Wound 01/05/22 Ankle Lateral;Left #1 (Active)   Wound Image   01/05/22 1018   Wound Etiology Arterial 02/25/22 1130   Wound Cleansed Cleansed with saline 02/25/22 1130   Dressing/Treatment Antibacterial ointment;Triad hydro/zinc oxide-based hydrophilic paste;Dry dressing 01/21/22 1224   Wound Length (cm) 1.1 cm 02/25/22 1130   Wound Width (cm) 0.5 cm 02/25/22 1130   Wound Depth (cm) 0.3 cm 02/25/22 1130   Wound Surface Area (cm^2) 0.55 cm^2 02/25/22 1130   Change in Wound Size % (l*w) 8.33 02/25/22 1130   Wound Volume (cm^3) 0.165 cm^3 02/25/22 1130   Wound Healing % -38 02/25/22 1130   Post-Procedure Length (cm) 1.1 cm 02/25/22 1147   Post-Procedure Width (cm) 0.5 cm 02/25/22 1147   Post-Procedure Depth (cm) 0.3 cm 02/25/22 1147   Post-Procedure Surface Area (cm^2) 0.55 cm^2 02/25/22 1147   Post-Procedure Volume (cm^3) 0.165 cm^3 02/25/22 1147   Wound Assessment Bleeding 02/25/22 1147   Drainage Amount Large 02/25/22 1130   Drainage Description Brown;Yellow; Thick 02/25/22 1130   Odor None 02/25/22 1130   Tianna-wound Assessment Intact 02/25/22 1130   Margins Defined edges 02/25/22

## 2022-02-25 NOTE — PLAN OF CARE
Discharge instructions given. Patient verbalized understanding. Return to [address] [place] [address],[address] in 2 week(s).

## 2022-03-04 ENCOUNTER — HOSPITAL ENCOUNTER (OUTPATIENT)
Dept: WOUND CARE | Age: 77
Discharge: HOME | End: 2022-03-04
Payer: MEDICARE

## 2022-03-04 VITALS
TEMPERATURE: 97 F | RESPIRATION RATE: 16 BRPM | SYSTOLIC BLOOD PRESSURE: 140 MMHG | DIASTOLIC BLOOD PRESSURE: 76 MMHG | HEART RATE: 58 BPM

## 2022-03-04 DIAGNOSIS — L97.324 NON-PRESSURE CHRONIC ULCER OF LEFT ANKLE WITH NECROSIS OF BONE (HCC): Primary | ICD-10-CM

## 2022-03-04 PROCEDURE — 11043 DBRDMT MUSC&/FSCA 1ST 20/<: CPT

## 2022-03-04 RX ORDER — LIDOCAINE HYDROCHLORIDE 20 MG/ML
JELLY TOPICAL ONCE
Status: CANCELLED | OUTPATIENT
Start: 2022-03-04 | End: 2022-03-04

## 2022-03-04 RX ORDER — GENTAMICIN SULFATE 1 MG/G
OINTMENT TOPICAL ONCE
Status: CANCELLED | OUTPATIENT
Start: 2022-03-04 | End: 2022-03-04

## 2022-03-04 RX ORDER — CLOBETASOL PROPIONATE 0.5 MG/G
OINTMENT TOPICAL ONCE
Status: CANCELLED | OUTPATIENT
Start: 2022-03-04 | End: 2022-03-04

## 2022-03-04 RX ORDER — BACITRACIN ZINC AND POLYMYXIN B SULFATE 500; 1000 [USP'U]/G; [USP'U]/G
OINTMENT TOPICAL ONCE
Status: CANCELLED | OUTPATIENT
Start: 2022-03-04 | End: 2022-03-04

## 2022-03-04 RX ORDER — LIDOCAINE 50 MG/G
OINTMENT TOPICAL ONCE
Status: CANCELLED | OUTPATIENT
Start: 2022-03-04 | End: 2022-03-04

## 2022-03-04 RX ORDER — BETAMETHASONE DIPROPIONATE 0.05 %
OINTMENT (GRAM) TOPICAL ONCE
Status: CANCELLED | OUTPATIENT
Start: 2022-03-04 | End: 2022-03-04

## 2022-03-04 RX ORDER — GINSENG 100 MG
CAPSULE ORAL ONCE
Status: CANCELLED | OUTPATIENT
Start: 2022-03-04 | End: 2022-03-04

## 2022-03-04 RX ORDER — LIDOCAINE HYDROCHLORIDE 40 MG/ML
SOLUTION TOPICAL ONCE
Status: CANCELLED | OUTPATIENT
Start: 2022-03-04 | End: 2022-03-04

## 2022-03-04 RX ORDER — LIDOCAINE 40 MG/G
CREAM TOPICAL ONCE
Status: DISCONTINUED | OUTPATIENT
Start: 2022-03-04 | End: 2022-03-05 | Stop reason: HOSPADM

## 2022-03-04 RX ORDER — LIDOCAINE 40 MG/G
CREAM TOPICAL ONCE
Status: CANCELLED | OUTPATIENT
Start: 2022-03-04 | End: 2022-03-04

## 2022-03-04 RX ORDER — BACITRACIN, NEOMYCIN, POLYMYXIN B 400; 3.5; 5 [USP'U]/G; MG/G; [USP'U]/G
OINTMENT TOPICAL ONCE
Status: CANCELLED | OUTPATIENT
Start: 2022-03-04 | End: 2022-03-04

## 2022-03-04 NOTE — PROGRESS NOTES
[facility]   Progress Note and Procedure Note      Katia Delgado  AGE: 64 y.o. GENDER: female  : 1945  TODAY'S DATE:  3/4/2022    Subjective:     Chief Complaint   Patient presents with    Wound Check     left lower extremity         HISTORY of PRESENT ILLNESS HPI     Katia Delgado is a 64 y.o. female who presents today for wound evaluation. History of Wound: Admits to having wound on her leg for months. She states she did not know was a sore until her primary care physician removed a scab. She states that it been hurting in the area for a while. She has had a revascularization with angiogram in December. She states she still not smoking. She is trying to change the bandage as directed. She has been doing better with the bandage changes this week. She states that her pain has significantly improved this week.      Wound Pain: Minimal  Severity: 2 / 10   Wound Type:  arterial and refractory osteomyelitis  Modifying Factors:  edema, venous stasis, lymphedema, arterial insufficiency, decreased tissue oxygenation and substance abuse  Associated Signs/Symptoms:  edema, drainage and pain        PAST MEDICAL HISTORY        Diagnosis Date    CAD (coronary artery disease)     Hyperlipidemia     Hypertension     PAD (peripheral artery disease) ([facility] [address]        PAST SURGICAL HISTORY    Past Surgical History:   Procedure Laterality Date    IR ANGXPTA FEM POP W STENT         FAMILY HISTORY    Family History   Problem Relation Age of Onset    Coronary Art Dis Mother        SOCIAL HISTORY    Social History     Tobacco Use    Smoking status: Former Smoker     Packs/day: 0.50     Quit date: 2022     Years since quittin.1    Smokeless tobacco: Never Used   Vaping Use    Vaping Use: Some days    Substances: Nicotine   Substance Use Topics    Alcohol use: Not Currently    Drug use: Not on file       ALLERGIES    No Known Allergies    MEDICATIONS    Current Outpatient Medications on File Prior to Encounter   Medication Sig Dispense Refill    ibuprofen (ADVIL;MOTRIN) 800 MG tablet TAKE ONE TABLET BY MOUTH THREE TIMES DAILY      gentamicin (GARAMYCIN) 0.1 % cream Apply topically daily. 30 g 1    lisinopril-hydroCHLOROthiazide (PRINZIDE;ZESTORETIC) 20-25 MG per tablet TAKE ONE TABLET BY MOUTH EVERY DAY      clopidogrel (PLAVIX) 75 MG tablet Take 75 mg by mouth daily      atorvastatin (LIPITOR) 10 MG tablet TAKE ONE TABLET BY MOUTH EVERY DAY      aspirin 81 MG EC tablet Take 81 mg by mouth daily      Potassium Chloride Jessie ER (KLOR-CON M20 PO) Take by mouth daily       No current facility-administered medications on file prior to encounter. REVIEW OF SYSTEMS    Pertinent items are noted in HPI. Objective:      BP (!) 140/76   Pulse 58   Temp 97 °F (36.1 °C) (Infrared)   Resp 16     PHYSICAL EXAM    Vascular: Vascular status Impaired  palpable pedal pulses, right DP0/4 and PT0/4, left DP0/4 and PT0/4. CFT 4 seconds digits 1 to 5 bilateral.  Hair growthAbsent  both lower extremities and feet. Skin temperature is warm to warm from pretibial area to distal digits bilateral.  Exam is negative for rubor, pallor, cyanosis or signs of acute vascular compromise bilaterally. Exam is positive for edema bilateral lower extremity. Varicosities Present bilateral lower extremity. Neuro: Neurologic status normal bilateral with epicritic Present , proprioceptive Present, vibratory sensationPresent and protopathicPresent. DTRs Present bilateral Achilles. There were no reproducible neuritic symptoms on exam bilateral feet/ankles. Derm: Ulceration to left ankle. Ecchymosis Absent  left feet/foot. Musculoskeletal: Pain with palpation and debridement of wound 5/5 muscle strength in/eversion and dorsi/plantarflexion bilateral feet. No gross instability noted.       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         Devitalized Tissue Debrided:  fibrin, slough, necrotic/eschar and exudate    Pre Debridement Measurements:  Are located in the Wound Documentation Flow Sheet    Wound #: 1     Wound Care Documentation:  Wound 01/05/22 Ankle Lateral;Left #1 (Active)   Wound Image   03/04/22 1116   Wound Etiology Arterial 03/04/22 1108   Wound Cleansed Cleansed with saline 03/04/22 1108   Dressing/Treatment Antibacterial ointment;Dry dressing;Triad hydro/zinc oxide-based hydrophilic paste 68/96/28 9171   Wound Length (cm) 0.8 cm 03/04/22 1108   Wound Width (cm) 0.3 cm 03/04/22 1108   Wound Depth (cm) 0.2 cm 03/04/22 1108   Wound Surface Area (cm^2) 0.24 cm^2 03/04/22 1108   Change in Wound Size % (l*w) 60 03/04/22 1108   Wound Volume (cm^3) 0.048 cm^3 03/04/22 1108   Wound Healing % 60 03/04/22 1108   Post-Procedure Length (cm) 0.8 cm 03/04/22 1116   Post-Procedure Width (cm) 0.3 cm 03/04/22 1116   Post-Procedure Depth (cm) 0.2 cm 03/04/22 1116   Post-Procedure Surface Area (cm^2) 0.24 cm^2 03/04/22 1116   Post-Procedure Volume (cm^3) 0.048 cm^3 03/04/22 1116   Wound Assessment Bleeding 03/04/22 1116   Drainage Amount Moderate 03/04/22 1108   Drainage Description Yellow;Serosanguinous 03/04/22 1108   Odor None 03/04/22 1108   Tianna-wound Assessment Intact 03/04/22 1108   Margins Defined edges 03/04/22 1108   Wound Thickness Description not for Pressure Injury Full thickness 01/05/22 1018   Number of days: 58       Total Surface Area Debrided:  0.24 sq cm     Percentage of wound debrided 100%    Bleeding:  Minimal    Hemostasis Achieved:  by pressure    Procedural Pain:  4  / 10     Post Procedural Pain:  0 / 10     Response to treatment:  Well tolerated by patient. Plan:   Patient examined and evaluated   Relation tissue noted today. Wound sharply debrided without incident   Discussed appropriate diet   Daily dressing changes with gentamicin cream and triad recommend that her friend help her change the bandage. Patient would benefit from hyperbaric oxygen treatment given her chronic osteomyelitis. Growth on wound culture awaiting sensitivities  Discussed acute versus chronic ischemia of the leg. She would benefit from hyperbaric oxygen treatment for her chronic wound and osteomyelitis. The nature of the patient's condition was explained in depth. The patient was informed that their compliance to the treatment plan is paramount to successful healing and prevention of further ulceration and/or infection.     Discharge Treatment Wound 01/05/22 Ankle Lateral;Left #1-Dressing/Treatment: Antibacterial ointment,Dry dressing,Triad hydro/zinc oxide-based hydrophilic pasteDaily dressing changes    Written Patient Discharge Instructions Given            Electronically signed by Kathrin Jiménez DPM on 3/4/2022 at 11:46 AM

## 2022-03-04 NOTE — PLAN OF CARE
Discharge instructions given. Patient verbalized understanding. Return to [facility] in 1 week(s). Awaiting MRI To be scheduled. Pt wishing to go closer to home.  After MRI resulted, will proceed with HBO PA

## 2022-03-08 LAB
GRAM STAIN RESULT: ABNORMAL
ORGANISM: ABNORMAL
REPORT: NORMAL
WOUND/ABSCESS: ABNORMAL

## 2022-03-18 ENCOUNTER — HOSPITAL ENCOUNTER (OUTPATIENT)
Dept: WOUND CARE | Age: 77
Discharge: HOME | End: 2022-03-18
Payer: MEDICARE

## 2022-03-18 VITALS
HEART RATE: 63 BPM | RESPIRATION RATE: 16 BRPM | SYSTOLIC BLOOD PRESSURE: 133 MMHG | TEMPERATURE: 96 F | DIASTOLIC BLOOD PRESSURE: 76 MMHG

## 2022-03-18 DIAGNOSIS — L97.324 NON-PRESSURE CHRONIC ULCER OF LEFT ANKLE WITH NECROSIS OF BONE (HCC): Primary | ICD-10-CM

## 2022-03-18 PROCEDURE — 11043 DBRDMT MUSC&/FSCA 1ST 20/<: CPT

## 2022-03-18 RX ORDER — LIDOCAINE HYDROCHLORIDE 20 MG/ML
JELLY TOPICAL ONCE
Status: CANCELLED | OUTPATIENT
Start: 2022-03-18 | End: 2022-03-18

## 2022-03-18 RX ORDER — CLOBETASOL PROPIONATE 0.5 MG/G
OINTMENT TOPICAL ONCE
Status: CANCELLED | OUTPATIENT
Start: 2022-03-18 | End: 2022-03-18

## 2022-03-18 RX ORDER — GENTAMICIN SULFATE 1 MG/G
OINTMENT TOPICAL ONCE
Status: CANCELLED | OUTPATIENT
Start: 2022-03-18 | End: 2022-03-18

## 2022-03-18 RX ORDER — BACITRACIN, NEOMYCIN, POLYMYXIN B 400; 3.5; 5 [USP'U]/G; MG/G; [USP'U]/G
OINTMENT TOPICAL ONCE
Status: CANCELLED | OUTPATIENT
Start: 2022-03-18 | End: 2022-03-18

## 2022-03-18 RX ORDER — LIDOCAINE 40 MG/G
CREAM TOPICAL ONCE
Status: DISCONTINUED | OUTPATIENT
Start: 2022-03-18 | End: 2022-03-19 | Stop reason: HOSPADM

## 2022-03-18 RX ORDER — BETAMETHASONE DIPROPIONATE 0.05 %
OINTMENT (GRAM) TOPICAL ONCE
Status: CANCELLED | OUTPATIENT
Start: 2022-03-18 | End: 2022-03-18

## 2022-03-18 RX ORDER — BACITRACIN ZINC AND POLYMYXIN B SULFATE 500; 1000 [USP'U]/G; [USP'U]/G
OINTMENT TOPICAL ONCE
Status: CANCELLED | OUTPATIENT
Start: 2022-03-18 | End: 2022-03-18

## 2022-03-18 RX ORDER — GINSENG 100 MG
CAPSULE ORAL ONCE
Status: CANCELLED | OUTPATIENT
Start: 2022-03-18 | End: 2022-03-18

## 2022-03-18 RX ORDER — LIDOCAINE 40 MG/G
CREAM TOPICAL ONCE
Status: CANCELLED | OUTPATIENT
Start: 2022-03-18 | End: 2022-03-18

## 2022-03-18 RX ORDER — LIDOCAINE HYDROCHLORIDE 40 MG/ML
SOLUTION TOPICAL ONCE
Status: CANCELLED | OUTPATIENT
Start: 2022-03-18 | End: 2022-03-18

## 2022-03-18 RX ORDER — LIDOCAINE 50 MG/G
OINTMENT TOPICAL ONCE
Status: CANCELLED | OUTPATIENT
Start: 2022-03-18 | End: 2022-03-18

## 2022-03-18 ASSESSMENT — PAIN DESCRIPTION - PROGRESSION: CLINICAL_PROGRESSION: NOT CHANGED

## 2022-03-18 ASSESSMENT — PAIN DESCRIPTION - ONSET: ONSET: ON-GOING

## 2022-03-18 ASSESSMENT — PAIN - FUNCTIONAL ASSESSMENT: PAIN_FUNCTIONAL_ASSESSMENT: ACTIVITIES ARE NOT PREVENTED

## 2022-03-18 ASSESSMENT — PAIN DESCRIPTION - LOCATION: LOCATION: FOOT;ANKLE

## 2022-03-18 ASSESSMENT — PAIN DESCRIPTION - ORIENTATION: ORIENTATION: LEFT

## 2022-03-18 ASSESSMENT — PAIN DESCRIPTION - FREQUENCY: FREQUENCY: INTERMITTENT

## 2022-03-18 ASSESSMENT — PAIN SCALES - GENERAL: PAINLEVEL_OUTOF10: 10

## 2022-03-18 ASSESSMENT — PAIN DESCRIPTION - DESCRIPTORS: DESCRIPTORS: DULL;ACHING

## 2022-03-18 ASSESSMENT — PAIN DESCRIPTION - PAIN TYPE: TYPE: CHRONIC PAIN

## 2022-03-18 NOTE — PLAN OF CARE
Discharge instructions given. Patient verbalized understanding. Return to [facility] in 1 week(s).   Hyperbaric PA is under review

## 2022-03-18 NOTE — PROGRESS NOTES
[facility]  Progress Note and Procedure Note      Karen Gilbert  AGE: 64 y.o. GENDER: female  : 1945  TODAY'S DATE:  3/18/2022    Subjective:     Chief Complaint   Patient presents with    Wound Check     lower extremities         HISTORY of PRESENT ILLNESS HPI     Karen Gilbert is a 64 y.o. female who presents today for wound evaluation. History of Wound: Admits to having wound on her leg for months. She states she did not know was a sore until her primary care physician removed a scab. She states that it been hurting in the area for a while. She has had a revascularization with angiogram in December. She admits to her fingers turning colors and losing feeling. She states she still not smoking but she is using the vaping some. She is trying to change the bandage as directed. She has been doing better with the bandage changes this week. States that she is continuing to improve and has had minimal pain since last seen except for the bandage change.      Wound Pain: Minimal  Severity: 2 / 10   Wound Type:  arterial and refractory osteomyelitis  Modifying Factors:  edema, venous stasis, lymphedema, arterial insufficiency, decreased tissue oxygenation and substance abuse  Associated Signs/Symptoms:  edema, drainage and pain        PAST MEDICAL HISTORY        Diagnosis Date    CAD (coronary artery disease)     Hyperlipidemia     Hypertension     PAD (peripheral artery disease) ([facility] [address]        PAST SURGICAL HISTORY    Past Surgical History:   Procedure Laterality Date    IR ANGXPTA FEM POP W STENT         FAMILY HISTORY    Family History   Problem Relation Age of Onset    Coronary Art Dis Mother        SOCIAL HISTORY    Social History     Tobacco Use    Smoking status: Former Smoker     Packs/day: 0.50     Quit date: 2022     Years since quittin.1    Smokeless tobacco: Never Used   Vaping Use    Vaping Use: Some days    Substances: Nicotine   Substance Use Topics    Alcohol use: Not Currently    Drug use: Not on file       ALLERGIES    No Known Allergies    MEDICATIONS    Current Outpatient Medications on File Prior to Encounter   Medication Sig Dispense Refill    ibuprofen (ADVIL;MOTRIN) 800 MG tablet TAKE ONE TABLET BY MOUTH THREE TIMES DAILY      gentamicin (GARAMYCIN) 0.1 % cream Apply topically daily. 30 g 1    lisinopril-hydroCHLOROthiazide (PRINZIDE;ZESTORETIC) 20-25 MG per tablet TAKE ONE TABLET BY MOUTH EVERY DAY      clopidogrel (PLAVIX) 75 MG tablet Take 75 mg by mouth daily      atorvastatin (LIPITOR) 10 MG tablet TAKE ONE TABLET BY MOUTH EVERY DAY      aspirin 81 MG EC tablet Take 81 mg by mouth daily      Potassium Chloride Jessie ER (KLOR-CON M20 PO) Take by mouth daily       No current facility-administered medications on file prior to encounter. REVIEW OF SYSTEMS    Pertinent items are noted in HPI. Objective:      /76   Pulse 63   Temp 96 °F (35.6 °C) (Infrared)   Resp 16     PHYSICAL EXAM    Vascular: Vascular status Impaired  palpable pedal pulses, right DP0/4 and PT0/4, left DP0/4 and PT0/4. CFT 4 seconds digits 1 to 5 bilateral.  Hair growthAbsent  both lower extremities and feet. Skin temperature is warm to warm from pretibial area to distal digits bilateral.  Exam is negative for rubor, pallor, cyanosis or signs of acute vascular compromise bilaterally. Exam is positive for edema bilateral lower extremity. Varicosities Present bilateral lower extremity. Neuro: Neurologic status normal bilateral with epicritic Present , proprioceptive Present, vibratory sensationPresent and protopathicPresent. DTRs Present bilateral Achilles. There were no reproducible neuritic symptoms on exam bilateral feet/ankles. Derm: Ulceration to left ankle. Ecchymosis Absent  left feet/foot. Musculoskeletal: Pain with palpation and debridement of wound 5/5 muscle strength in/eversion and dorsi/plantarflexion bilateral feet.   No gross instability noted. 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         Devitalized Tissue Debrided:  fibrin, slough, necrotic/eschar and exudate    Pre Debridement Measurements:  Are located in the Wound Documentation Flow Sheet    Wound #: 1     Wound Care Documentation:  Wound 01/05/22 Ankle Lateral;Left #1 (Active)   Wound Image   03/04/22 1116   Wound Etiology Arterial 03/18/22 1109   Wound Cleansed Cleansed with saline 03/18/22 1109   Dressing/Treatment Antibacterial ointment;Dry dressing;Triad hydro/zinc oxide-based hydrophilic paste 12/48/20 5831   Wound Length (cm) 0.7 cm 03/18/22 1109   Wound Width (cm) 0.3 cm 03/18/22 1109   Wound Depth (cm) 0.1 cm 03/18/22 1109   Wound Surface Area (cm^2) 0.21 cm^2 03/18/22 1109   Change in Wound Size % (l*w) 65 03/18/22 1109   Wound Volume (cm^3) 0.021 cm^3 03/18/22 1109   Wound Healing % 82 03/18/22 1109   Post-Procedure Length (cm) 0.7 cm 03/18/22 1132   Post-Procedure Width (cm) 0.3 cm 03/18/22 1132   Post-Procedure Depth (cm) 0.1 cm 03/18/22 1132   Post-Procedure Surface Area (cm^2) 0.21 cm^2 03/18/22 1132   Post-Procedure Volume (cm^3) 0.021 cm^3 03/18/22 1132   Wound Assessment Bleeding 03/18/22 1132   Drainage Amount Small 03/18/22 1109   Drainage Description Serosanguinous 03/18/22 1109   Odor None 03/18/22 1109   Tianna-wound Assessment Intact 03/18/22 1109   Margins Defined edges 03/18/22 1109   Wound Thickness Description not for Pressure Injury Full thickness 01/05/22 1018   Number of days: 72         Total Surface Area Debrided:  0.24 sq cm     Percentage of wound debrided 100%    Bleeding:  Minimal    Hemostasis Achieved:  by pressure    Procedural Pain:  4  / 10     Post Procedural Pain:  0 / 10     Response to treatment:  Well tolerated by patient. Plan:   Patient examined and evaluated   Relation tissue noted today. 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 Patient would benefit from hyperbaric oxygen treatment given her chronic osteomyelitis. Growth on wound culture awaiting sensitivities  Discussed acute versus chronic ischemia of the leg. She would benefit from hyperbaric oxygen treatment for her chronic wound and osteomyelitis. The nature of the patient's condition was explained in depth. The patient was informed that their compliance to the treatment plan is paramount to successful healing and prevention of further ulceration and/or infection.     Discharge Treatment  Daily dressing changes    Written Patient Discharge Instructions Given            Electronically signed by Argenis Moulton DPM on 3/18/2022 at 11:38 AM

## 2022-04-08 ENCOUNTER — HOSPITAL ENCOUNTER (OUTPATIENT)
Dept: WOUND CARE | Age: 77
Discharge: HOME | End: 2022-04-08
Payer: MEDICARE

## 2022-04-08 VITALS
DIASTOLIC BLOOD PRESSURE: 70 MMHG | SYSTOLIC BLOOD PRESSURE: 131 MMHG | HEART RATE: 63 BPM | TEMPERATURE: 97.3 F | RESPIRATION RATE: 16 BRPM

## 2022-04-08 DIAGNOSIS — L97.324 NON-PRESSURE CHRONIC ULCER OF LEFT ANKLE WITH NECROSIS OF BONE (HCC): Primary | ICD-10-CM

## 2022-04-08 PROCEDURE — 11043 DBRDMT MUSC&/FSCA 1ST 20/<: CPT

## 2022-04-08 RX ORDER — LIDOCAINE 40 MG/G
CREAM TOPICAL ONCE
Status: CANCELLED | OUTPATIENT
Start: 2022-04-08 | End: 2022-04-08

## 2022-04-08 RX ORDER — BACITRACIN ZINC AND POLYMYXIN B SULFATE 500; 1000 [USP'U]/G; [USP'U]/G
OINTMENT TOPICAL ONCE
Status: CANCELLED | OUTPATIENT
Start: 2022-04-08 | End: 2022-04-08

## 2022-04-08 RX ORDER — LIDOCAINE 40 MG/G
CREAM TOPICAL ONCE
Status: DISCONTINUED | OUTPATIENT
Start: 2022-04-08 | End: 2022-04-09 | Stop reason: HOSPADM

## 2022-04-08 RX ORDER — BACITRACIN, NEOMYCIN, POLYMYXIN B 400; 3.5; 5 [USP'U]/G; MG/G; [USP'U]/G
OINTMENT TOPICAL ONCE
Status: CANCELLED | OUTPATIENT
Start: 2022-04-08 | End: 2022-04-08

## 2022-04-08 RX ORDER — GENTAMICIN SULFATE 1 MG/G
OINTMENT TOPICAL ONCE
Status: CANCELLED | OUTPATIENT
Start: 2022-04-08 | End: 2022-04-08

## 2022-04-08 RX ORDER — BETAMETHASONE DIPROPIONATE 0.05 %
OINTMENT (GRAM) TOPICAL ONCE
Status: CANCELLED | OUTPATIENT
Start: 2022-04-08 | End: 2022-04-08

## 2022-04-08 RX ORDER — LIDOCAINE HYDROCHLORIDE 20 MG/ML
JELLY TOPICAL ONCE
Status: CANCELLED | OUTPATIENT
Start: 2022-04-08 | End: 2022-04-08

## 2022-04-08 RX ORDER — GINSENG 100 MG
CAPSULE ORAL ONCE
Status: CANCELLED | OUTPATIENT
Start: 2022-04-08 | End: 2022-04-08

## 2022-04-08 RX ORDER — LIDOCAINE 50 MG/G
OINTMENT TOPICAL ONCE
Status: CANCELLED | OUTPATIENT
Start: 2022-04-08 | End: 2022-04-08

## 2022-04-08 RX ORDER — LIDOCAINE HYDROCHLORIDE 40 MG/ML
SOLUTION TOPICAL ONCE
Status: CANCELLED | OUTPATIENT
Start: 2022-04-08 | End: 2022-04-08

## 2022-04-08 RX ORDER — CLOBETASOL PROPIONATE 0.5 MG/G
OINTMENT TOPICAL ONCE
Status: CANCELLED | OUTPATIENT
Start: 2022-04-08 | End: 2022-04-08

## 2022-04-08 NOTE — PROGRESS NOTES
Parris Lott  Progress Note and Procedure Note      Dilan Israel  AGE: 64 y.o. GENDER: female  : 1945  TODAY'S DATE:  2022    Subjective:     Chief Complaint   Patient presents with    Wound Check     Left lower leg         HISTORY of PRESENT ILLNESS HPI     Dilan Israel is a 64 y.o. female who presents today for wound evaluation. History of Wound: Admits to having wound on her leg for months. She states she did not know was a sore until her primary care physician removed a scab. She states that it been hurting in the area for a while. She has had a revascularization with angiogram in December. She is changing the bandage as directed. She states that she feels the wound is continuing to slowly look better. She is still vaping.      Wound Pain: Minimal  Severity: 2 / 10   Wound Type:  arterial and refractory osteomyelitis  Modifying Factors:  edema, venous stasis, lymphedema, arterial insufficiency, decreased tissue oxygenation and substance abuse  Associated Signs/Symptoms:  edema, drainage and pain        PAST MEDICAL HISTORY        Diagnosis Date    CAD (coronary artery disease)     Hyperlipidemia     Hypertension     PAD (peripheral artery disease) ([facility] [address]        PAST SURGICAL HISTORY    Past Surgical History:   Procedure Laterality Date    IR ANGXPTA FEM POP W STENT         FAMILY HISTORY    Family History   Problem Relation Age of Onset    Coronary Art Dis Mother        SOCIAL HISTORY    Social History     Tobacco Use    Smoking status: Former Smoker     Packs/day: 0.50     Quit date: 2022     Years since quittin.2    Smokeless tobacco: Never Used   Vaping Use    Vaping Use: Some days    Substances: Nicotine   Substance Use Topics    Alcohol use: Not Currently    Drug use: Not on file       ALLERGIES    No Known Allergies    MEDICATIONS    Current Outpatient Medications on File Prior to Encounter   Medication Sig Dispense Refill    ibuprofen (ADVIL;MOTRIN) 800 MG tablet TAKE ONE TABLET BY MOUTH THREE TIMES DAILY      gentamicin (GARAMYCIN) 0.1 % cream Apply topically daily. 30 g 1    lisinopril-hydroCHLOROthiazide (PRINZIDE;ZESTORETIC) 20-25 MG per tablet TAKE ONE TABLET BY MOUTH EVERY DAY      clopidogrel (PLAVIX) 75 MG tablet Take 75 mg by mouth daily      atorvastatin (LIPITOR) 10 MG tablet TAKE ONE TABLET BY MOUTH EVERY DAY      aspirin 81 MG EC tablet Take 81 mg by mouth daily      Potassium Chloride Jessie ER (KLOR-CON M20 PO) Take by mouth daily       No current facility-administered medications on file prior to encounter. REVIEW OF SYSTEMS    Pertinent items are noted in HPI. Objective:      /70   Pulse 63   Temp 97.3 °F (36.3 °C) (Infrared)   Resp 16     PHYSICAL EXAM    Vascular: Vascular status Impaired  palpable pedal pulses, right DP0/4 and PT0/4, left DP0/4 and PT0/4. CFT 4 seconds digits 1 to 5 bilateral.  Hair growthAbsent  both lower extremities and feet. Skin temperature is warm to warm from pretibial area to distal digits bilateral.  Exam is negative for rubor, pallor, cyanosis or signs of acute vascular compromise bilaterally. Exam is positive for edema bilateral lower extremity. Varicosities Present bilateral lower extremity. Neuro: Neurologic status normal bilateral with epicritic Present , proprioceptive Present, vibratory sensationPresent and protopathicPresent. DTRs Present bilateral Achilles. There were no reproducible neuritic symptoms on exam bilateral feet/ankles. Derm: Ulceration to left ankle. Ecchymosis Absent  left feet/foot. Musculoskeletal: Pain with palpation and debridement of wound 5/5 muscle strength in/eversion and dorsi/plantarflexion bilateral feet. No gross instability noted.       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         Devitalized Tissue Debrided:  fibrin, slough, necrotic/eschar and exudate    Pre Debridement Measurements:  Are located in the Wound Documentation Flow Sheet    Wound #: 1     Wound Care Documentation:  Wound 01/05/22 Ankle Lateral;Left #1 (Active)   Wound Image   03/04/22 1116   Wound Etiology Arterial 04/08/22 1148   Wound Cleansed Cleansed with saline 04/08/22 1148   Dressing/Treatment Antibacterial ointment;Dry dressing;Triad hydro/zinc oxide-based hydrophilic paste 33/23/35 4704   Wound Length (cm) 0.8 cm 04/08/22 1148   Wound Width (cm) 0.3 cm 04/08/22 1148   Wound Depth (cm) 0.2 cm 04/08/22 1148   Wound Surface Area (cm^2) 0.24 cm^2 04/08/22 1148   Change in Wound Size % (l*w) 60 04/08/22 1148   Wound Volume (cm^3) 0.024 cm^3 04/08/22 1148   Wound Healing % 80 04/08/22 1148   Post-Procedure Length (cm) 0.8 cm 04/08/22 1156   Post-Procedure Width (cm) 0.3 cm 04/08/22 1156   Post-Procedure Depth (cm) 0.1 cm 04/08/22 1156   Post-Procedure Surface Area (cm^2) 0.24 cm^2 04/08/22 1156   Post-Procedure Volume (cm^3) 0.024 cm^3 04/08/22 1156   Wound Assessment Bleeding 04/08/22 1156   Drainage Amount Small 04/08/22 1148   Drainage Description Yellow 04/08/22 1148   Odor None 04/08/22 1148   Tianna-wound Assessment Intact 04/08/22 1148   Margins Defined edges 03/18/22 1109   Wound Thickness Description not for Pressure Injury Full thickness 01/05/22 1018   Number of days: 93       Total Surface Area Debrided:  0.24 sq cm     Percentage of wound debrided 100%    Bleeding:

## 2022-04-08 NOTE — PLAN OF CARE
Discharge instructions given. Patient verbalized understanding. Return to [facility] in 2 week(s).   HBO denied

## 2022-04-19 ENCOUNTER — OFFICE VISIT (OUTPATIENT)
Dept: CARDIOLOGY CLINIC | Age: 77
End: 2022-04-19
Payer: MEDICARE

## 2022-04-19 VITALS
BODY MASS INDEX: 17.89 KG/M2 | DIASTOLIC BLOOD PRESSURE: 80 MMHG | WEIGHT: 101 LBS | HEART RATE: 66 BPM | HEIGHT: 63 IN | SYSTOLIC BLOOD PRESSURE: 132 MMHG

## 2022-04-19 DIAGNOSIS — Z72.0 TOBACCO ABUSE: ICD-10-CM

## 2022-04-19 DIAGNOSIS — I10 PRIMARY HYPERTENSION: ICD-10-CM

## 2022-04-19 DIAGNOSIS — E78.49 OTHER HYPERLIPIDEMIA: Primary | ICD-10-CM

## 2022-04-19 PROCEDURE — 1123F ACP DISCUSS/DSCN MKR DOCD: CPT | Performed by: INTERNAL MEDICINE

## 2022-04-19 PROCEDURE — 99214 OFFICE O/P EST MOD 30 MIN: CPT | Performed by: INTERNAL MEDICINE

## 2022-04-19 PROCEDURE — 1036F TOBACCO NON-USER: CPT | Performed by: INTERNAL MEDICINE

## 2022-04-19 PROCEDURE — G8400 PT W/DXA NO RESULTS DOC: HCPCS | Performed by: INTERNAL MEDICINE

## 2022-04-19 PROCEDURE — G8419 CALC BMI OUT NRM PARAM NOF/U: HCPCS | Performed by: INTERNAL MEDICINE

## 2022-04-19 PROCEDURE — 1090F PRES/ABSN URINE INCON ASSESS: CPT | Performed by: INTERNAL MEDICINE

## 2022-04-19 PROCEDURE — 4040F PNEUMOC VAC/ADMIN/RCVD: CPT | Performed by: INTERNAL MEDICINE

## 2022-04-19 PROCEDURE — G8428 CUR MEDS NOT DOCUMENT: HCPCS | Performed by: INTERNAL MEDICINE

## 2022-04-19 NOTE — PROGRESS NOTES
[facility]   Cardiac Evaluation      Patient: Lakisha Prakash  YOB: 1945         Chief Complaint   Patient presents with    Hypertension    Hyperlipidemia        Referring provider: Hill GREEN DO    History of Present Illness:   Ms Ramon Downs is seen today for yearly follow up. She was previously followed with Charla Edgar cardiology, but her insurance changed so she has to switch cardiologists. Her cardiac history hypertension, hyperlipidemia. 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 Family history of mother having CABG in her [de-identified]. Ms Ramon Downs underwent Lt SFA intervention 12/10/21 with Dr Ashwin Alfaro. She has a chronic ulcer of left ankle for which she is going to wound clinic ; following with Dr Romy Jasmine. Sheila Rhodes quit smoking January, 2022 because she didn't want to [facility] my leg\". She is doing very well with her abstinence. Sheila Rhodes continues to work at [employer] 2 days/week. She is on her feet most of those days. She denies chest pain, palpitations, SIMMONS, dizziness, or edema. Sheila Rhodes takes care of her home and is physically active. She is thinking of moving to [place]. Past Medical History:   has a past medical history of CAD (coronary artery disease), Hyperlipidemia, Hypertension, and PAD (peripheral artery disease) ([address] Surgical History:   has a past surgical history that includes IR ANGIOPLASTY FEM POP W STENT.      Current Outpatient Medications   Medication Sig Dispense Refill    ibuprofen (ADVIL;MOTRIN) 800 MG tablet TAKE ONE TABLET BY MOUTH THREE TIMES DAILY      lisinopril-hydroCHLOROthiazide (PRINZIDE;ZESTORETIC) 20-25 MG per tablet TAKE ONE TABLET BY MOUTH EVERY DAY      clopidogrel (PLAVIX) 75 MG tablet Take 75 mg by mouth daily      atorvastatin (LIPITOR) 10 MG tablet TAKE ONE TABLET BY MOUTH EVERY DAY      aspirin 81 MG EC tablet Take 81 mg by mouth daily      Potassium Chloride Jessie ER (KLOR-CON M20 PO) Take by mouth daily  gentamicin (GARAMYCIN) 0.1 % cream Apply topically daily. 30 g 1     No current facility-administered medications for this visit. Allergies:  Patient has no known allergies. Social History:  Social History     Socioeconomic History    Marital status:      Spouse name: Not on file    Number of children: Not on file    Years of education: Not on file    Highest education level: Not on file   Occupational History    Not on file   Tobacco Use    Smoking status: Former Smoker     Packs/day: 0.50     Quit date: 2022     Years since quittin.2    Smokeless tobacco: Never Used   Vaping Use    Vaping Use: Some days    Substances: Nicotine   Substance and Sexual Activity    Alcohol use: Not Currently    Drug use: Not on file    Sexual activity: Not on file   Other Topics Concern    Not on file   Social History Narrative    Not on file     Social Determinants of Health     Financial Resource Strain:     Difficulty of Paying Living Expenses: Not on file   Food Insecurity:     Worried About [address] in the Last Year: Not on file    Efrain of Food in the Last Year: Not on file   Transportation Needs:     Lack of Transportation (Medical): Not on file    Lack of Transportation (Non-Medical):  Not on file   Physical Activity:     Days of Exercise per Week: Not on file    Minutes of Exercise per Session: Not on file   Stress:     Feeling of Stress : Not on file   Social Connections:     Frequency of Communication with Friends and Family: Not on file    Frequency of Social Gatherings with Friends and Family: Not on file    Attends [employer] Services: Not on file    Active Member of Clubs or Organizations: Not on file    Attends Club or Organization Meetings: Not on file    Marital Status: Not on file   Intimate Partner Violence:     Fear of Current or Ex-Partner: Not on file    Emotionally Abused: Not on file    Physically Abused: Not on file    Sexually Abused: Not on file   Housing Stability:     Unable to Pay for Housing in the Last Year: Not on file    Number of Places Lived in the Last Year: Not on file    Unstable Housing in the Last Year: Not on file       Family History:   Family History   Problem Relation Age of Onset    Coronary Art Dis Mother      Family history has been reviewed and not pertinent except as noted above. Review of Systems:   · Constitutional: there has been no unanticipated weight loss. No change in energy or activity level   · Eyes: No visual changes   · ENT: No Headaches, hearing loss or vertigo. No mouth sores or sore throat. · Cardiovascular: Reviewed in HPI  · Respiratory: No cough or wheezing, no sputum production. · Gastrointestinal: No abdominal pain, appetite loss, blood in stools. No change in bowel or bladder habits. · Genitourinary: No nocturia, dysuria, trouble voiding  · Musculoskeletal:  No gait disturbance, weakness or joint complaints. · Integumentary: No rash or pruritis. · Neurological: No headache, change in muscle strength, numbness or tingling. No change in gait, balance, coordination, mood, affect, memory, mentation, behavior. · Psychiatric: No anxiety or depression  · Endocrine: No malaise or fever  · Hematologic/Lymphatic: No abnormal bruising or bleeding, blood clots or swollen lymph nodes. · Allergic/Immunologic: No nasal congestion or hives. Physical Examination:    Vitals:    04/19/22 1303   BP: 132/80   Site: Left Upper Arm   Position: Sitting   Cuff Size: Medium Adult   Pulse: 66   Weight: 101 lb (45.8 kg)   Height: 5' 3\" (1.6 m)     Body mass index is 17.89 kg/m². Wt Readings from Last 3 Encounters:   04/19/22 101 lb (45.8 kg)   01/25/22 99 lb 3.2 oz (45 kg)   01/05/22 101 lb 12.8 oz (46.2 kg)      BP Readings from Last 3 Encounters:   04/19/22 132/80   04/08/22 131/70   03/18/22 133/76        Physical Examination:    · CONSTITUTIONAL: Well developed, well nourished  · EYES: PERRLA.  No changes. FU 1 year. Scribe's attestation: This note was scribed in the presence of Dr Jose Bains MD by Gilma Dougherty, JEWELL. The scribe's documentation has been prepared under my direction and personally reviewed by me in its entirety. I confirm that the note above accurately reflects all work, treatment, procedures, and medical decision making performed by me. Thank you for allowing to me to participate in the care of Alona Daily.

## 2022-04-22 ENCOUNTER — HOSPITAL ENCOUNTER (OUTPATIENT)
Dept: WOUND CARE | Age: 77
Discharge: HOME | End: 2022-04-22
Payer: MEDICARE

## 2022-04-22 VITALS — TEMPERATURE: 97.1 F | DIASTOLIC BLOOD PRESSURE: 72 MMHG | SYSTOLIC BLOOD PRESSURE: 124 MMHG | HEART RATE: 68 BPM

## 2022-04-22 DIAGNOSIS — L97.324 NON-PRESSURE CHRONIC ULCER OF LEFT ANKLE WITH NECROSIS OF BONE (HCC): Primary | ICD-10-CM

## 2022-04-22 PROCEDURE — 11043 DBRDMT MUSC&/FSCA 1ST 20/<: CPT

## 2022-04-22 RX ORDER — LIDOCAINE 40 MG/G
CREAM TOPICAL ONCE
Status: CANCELLED | OUTPATIENT
Start: 2022-04-22 | End: 2022-04-22

## 2022-04-22 RX ORDER — LIDOCAINE HYDROCHLORIDE 40 MG/ML
SOLUTION TOPICAL ONCE
Status: CANCELLED | OUTPATIENT
Start: 2022-04-22 | End: 2022-04-22

## 2022-04-22 RX ORDER — GENTAMICIN SULFATE 1 MG/G
OINTMENT TOPICAL ONCE
Status: CANCELLED | OUTPATIENT
Start: 2022-04-22 | End: 2022-04-22

## 2022-04-22 RX ORDER — GINSENG 100 MG
CAPSULE ORAL ONCE
Status: CANCELLED | OUTPATIENT
Start: 2022-04-22 | End: 2022-04-22

## 2022-04-22 RX ORDER — LIDOCAINE HYDROCHLORIDE 20 MG/ML
JELLY TOPICAL ONCE
Status: CANCELLED | OUTPATIENT
Start: 2022-04-22 | End: 2022-04-22

## 2022-04-22 RX ORDER — LIDOCAINE 50 MG/G
OINTMENT TOPICAL ONCE
Status: CANCELLED | OUTPATIENT
Start: 2022-04-22 | End: 2022-04-22

## 2022-04-22 RX ORDER — BETAMETHASONE DIPROPIONATE 0.05 %
OINTMENT (GRAM) TOPICAL ONCE
Status: CANCELLED | OUTPATIENT
Start: 2022-04-22 | End: 2022-04-22

## 2022-04-22 RX ORDER — LIDOCAINE 40 MG/G
CREAM TOPICAL ONCE
Status: DISCONTINUED | OUTPATIENT
Start: 2022-04-22 | End: 2022-04-23 | Stop reason: HOSPADM

## 2022-04-22 RX ORDER — CLOBETASOL PROPIONATE 0.5 MG/G
OINTMENT TOPICAL ONCE
Status: CANCELLED | OUTPATIENT
Start: 2022-04-22 | End: 2022-04-22

## 2022-04-22 RX ORDER — BACITRACIN ZINC AND POLYMYXIN B SULFATE 500; 1000 [USP'U]/G; [USP'U]/G
OINTMENT TOPICAL ONCE
Status: CANCELLED | OUTPATIENT
Start: 2022-04-22 | End: 2022-04-22

## 2022-04-22 RX ORDER — BACITRACIN, NEOMYCIN, POLYMYXIN B 400; 3.5; 5 [USP'U]/G; MG/G; [USP'U]/G
OINTMENT TOPICAL ONCE
Status: CANCELLED | OUTPATIENT
Start: 2022-04-22 | End: 2022-04-22

## 2022-04-22 RX ORDER — CLOTRIMAZOLE 1 %
CREAM (GRAM) TOPICAL
Qty: 28 G | Refills: 1 | Status: SHIPPED | OUTPATIENT
Start: 2022-04-22 | End: 2022-04-29

## 2022-04-22 ASSESSMENT — PAIN - FUNCTIONAL ASSESSMENT: PAIN_FUNCTIONAL_ASSESSMENT: ACTIVITIES ARE NOT PREVENTED

## 2022-04-22 ASSESSMENT — PAIN DESCRIPTION - PAIN TYPE: TYPE: CHRONIC PAIN

## 2022-04-22 ASSESSMENT — PAIN DESCRIPTION - ORIENTATION: ORIENTATION: LEFT

## 2022-04-22 ASSESSMENT — PAIN SCALES - GENERAL: PAINLEVEL_OUTOF10: 3

## 2022-04-22 ASSESSMENT — PAIN DESCRIPTION - DESCRIPTORS: DESCRIPTORS: ACHING

## 2022-04-22 ASSESSMENT — PAIN DESCRIPTION - LOCATION: LOCATION: ANKLE

## 2022-04-22 NOTE — PROGRESS NOTES
Parris Lott  Progress Note and Procedure Note      Ena Garzon  AGE: 64 y.o. GENDER: female  : 1945  TODAY'S DATE:  2022    Subjective:     Chief Complaint   Patient presents with    Wound Check     left ankle F/U         HISTORY of PRESENT ILLNESS HPI     Ena Garzon is a 64 y.o. female who presents today for wound evaluation. History of Wound: Admits to having wound on her leg for months. She states she did not know was a sore until her primary care physician removed a scab. She states that it been hurting in the area for a while. She has had a revascularization with angiogram in December. She is changing the bandage as directed. She states that she feels the wound is continuing to slowly look better. She is still vaping.      Wound Pain: Minimal  Severity: 2 / 10   Wound Type:  arterial and refractory osteomyelitis  Modifying Factors:  edema, venous stasis, lymphedema, arterial insufficiency, decreased tissue oxygenation and substance abuse  Associated Signs/Symptoms:  edema, drainage and pain        PAST MEDICAL HISTORY        Diagnosis Date    CAD (coronary artery disease)     Hyperlipidemia     Hypertension     PAD (peripheral artery disease) ([facility] [address]        PAST SURGICAL HISTORY    Past Surgical History:   Procedure Laterality Date    IR ANGXPTA FEM POP W STENT         FAMILY HISTORY    Family History   Problem Relation Age of Onset    Coronary Art Dis Mother        SOCIAL HISTORY    Social History     Tobacco Use    Smoking status: Former Smoker     Packs/day: 0.50     Quit date: 2022     Years since quittin.2    Smokeless tobacco: Never Used   Vaping Use    Vaping Use: Some days    Substances: Nicotine   Substance Use Topics    Alcohol use: Not Currently    Drug use: Not on file       ALLERGIES    No Known Allergies    MEDICATIONS    Current Outpatient Medications on File Prior to Encounter   Medication Sig Dispense Refill    ibuprofen (ADVIL;MOTRIN) 800 MG tablet TAKE ONE TABLET BY MOUTH THREE TIMES DAILY      gentamicin (GARAMYCIN) 0.1 % cream Apply topically daily. 30 g 1    lisinopril-hydroCHLOROthiazide (PRINZIDE;ZESTORETIC) 20-25 MG per tablet TAKE ONE TABLET BY MOUTH EVERY DAY      clopidogrel (PLAVIX) 75 MG tablet Take 75 mg by mouth daily      atorvastatin (LIPITOR) 10 MG tablet TAKE ONE TABLET BY MOUTH EVERY DAY      aspirin 81 MG EC tablet Take 81 mg by mouth daily      Potassium Chloride Jessie ER (KLOR-CON M20 PO) Take by mouth daily       No current facility-administered medications on file prior to encounter. REVIEW OF SYSTEMS    Pertinent items are noted in HPI. Objective:      /72   Pulse 68   Temp 97.1 °F (36.2 °C) (Tympanic)     PHYSICAL EXAM    Vascular: Vascular status Impaired  palpable pedal pulses, right DP0/4 and PT0/4, left DP0/4 and PT0/4. CFT 4 seconds digits 1 to 5 bilateral.  Hair growthAbsent  both lower extremities and feet. Skin temperature is warm to warm from pretibial area to distal digits bilateral.  Exam is negative for rubor, pallor, cyanosis or signs of acute vascular compromise bilaterally. Exam is positive for edema bilateral lower extremity. Varicosities Present bilateral lower extremity. Neuro: Neurologic status normal bilateral with epicritic Present , proprioceptive Present, vibratory sensationPresent and protopathicPresent. DTRs Present bilateral Achilles. There were no reproducible neuritic symptoms on exam bilateral feet/ankles. Derm: Ulceration to left ankle. Ecchymosis Absent  left feet/foot. Musculoskeletal: Pain with palpation and debridement of wound 5/5 muscle strength in/eversion and dorsi/plantarflexion bilateral feet. No gross instability noted.       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         Devitalized Tissue Debrided:  fibrin, slough, necrotic/eschar and exudate    Pre Debridement Measurements:  Are located in the Wound Documentation Flow Sheet    Wound #: 1     Wound Care Documentation:  Wound 01/05/22 Ankle Lateral;Left #1 (Active)   Wound Image   03/04/22 1116   Wound Etiology Arterial 04/22/22 1111   Wound Cleansed Cleansed with saline 04/22/22 1111   Dressing/Treatment Antibacterial ointment;Dry dressing;Triad hydro/zinc oxide-based hydrophilic paste 99/31/24 7634   Wound Length (cm) 0.8 cm 04/22/22 1111   Wound Width (cm) 0.5 cm 04/22/22 1111   Wound Depth (cm) 0.1 cm 04/22/22 1111   Wound Surface Area (cm^2) 0.4 cm^2 04/22/22 1111   Change in Wound Size % (l*w) 33.33 04/22/22 1111   Wound Volume (cm^3) 0.04 cm^3 04/22/22 1111   Wound Healing % 67 04/22/22 1111   Post-Procedure Length (cm) 1 cm 04/22/22 1126   Post-Procedure Width (cm) 0.4 cm 04/22/22 1126   Post-Procedure Depth (cm) 0.1 cm 04/22/22 1126   Post-Procedure Surface Area (cm^2) 0.4 cm^2 04/22/22 1126   Post-Procedure Volume (cm^3) 0.04 cm^3 04/22/22 1126   Wound Assessment Bleeding 04/22/22 1126   Drainage Amount Moderate 04/22/22 1111   Drainage Description Yellow 04/22/22 1111   Odor None 04/22/22 1111   Tianna-wound Assessment Intact 04/22/22 1111   Margins Defined edges 04/22/22 1111   Wound Thickness Description not for Pressure Injury Full thickness 01/05/22 1018   Number of days: 107       Total Surface Area Debrided:  0.24 sq cm     Percentage of wound debrided 100%    Bleeding:  Minimal    Hemostasis

## 2022-04-22 NOTE — PLAN OF CARE
Discharge instructions given. Patient verbalized understanding. Return to [facility] in 1-2 week(s).

## 2022-04-27 ENCOUNTER — OFFICE VISIT (OUTPATIENT)
Dept: VASCULAR SURGERY | Age: 77
End: 2022-04-27

## 2022-04-27 ENCOUNTER — PROCEDURE VISIT (OUTPATIENT)
Dept: VASCULAR SURGERY | Age: 77
End: 2022-04-27
Payer: MEDICARE

## 2022-04-27 VITALS
WEIGHT: 99 LBS | DIASTOLIC BLOOD PRESSURE: 80 MMHG | SYSTOLIC BLOOD PRESSURE: 160 MMHG | HEIGHT: 64 IN | BODY MASS INDEX: 16.9 KG/M2

## 2022-04-27 DIAGNOSIS — I70.213 ATHEROSCLEROSIS OF NATIVE ARTERIES OF EXTREMITIES WITH INTERMITTENT CLAUDICATION, BILATERAL LEGS (HCC): Primary | ICD-10-CM

## 2022-04-27 DIAGNOSIS — I70.213 ATHEROSCLEROSIS OF NATIVE ARTERIES OF EXTREMITIES WITH INTERMITTENT CLAUDICATION, BILATERAL LEGS (HCC): ICD-10-CM

## 2022-04-27 PROCEDURE — 1090F PRES/ABSN URINE INCON ASSESS: CPT | Performed by: SURGERY

## 2022-04-27 PROCEDURE — 99213 OFFICE O/P EST LOW 20 MIN: CPT | Performed by: SURGERY

## 2022-04-27 PROCEDURE — G8400 PT W/DXA NO RESULTS DOC: HCPCS | Performed by: SURGERY

## 2022-04-27 PROCEDURE — 4040F PNEUMOC VAC/ADMIN/RCVD: CPT | Performed by: SURGERY

## 2022-04-27 PROCEDURE — 1123F ACP DISCUSS/DSCN MKR DOCD: CPT | Performed by: SURGERY

## 2022-04-27 PROCEDURE — 93925 LOWER EXTREMITY STUDY: CPT | Performed by: SURGERY

## 2022-04-27 PROCEDURE — G8419 CALC BMI OUT NRM PARAM NOF/U: HCPCS | Performed by: SURGERY

## 2022-04-27 PROCEDURE — 1036F TOBACCO NON-USER: CPT | Performed by: SURGERY

## 2022-04-27 PROCEDURE — G8427 DOCREV CUR MEDS BY ELIG CLIN: HCPCS | Performed by: SURGERY

## 2022-04-27 NOTE — PROGRESS NOTES
[facility])   Vascular Surgery Followup    Referring Provider:  Rios De La Torre DO     No chief complaint on file. History of Present Illness:  68-year-old female here today for follow-up with known his peripheral vascular disease including left foot ulceration and previous left SFA intervention in December 2021. She presents today for routine duplex imaging surveillance. Duplex today reveals a widely patent left SFA. 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 Left RON of 0.88. She denies any complaints today. States the wound continues to slowly heal.  She states she was denied for hyperbaric therapy. She quit smoking 3 months ago    Past Medical History:   has a past medical history of CAD (coronary artery disease), Hyperlipidemia, Hypertension, and PAD (peripheral artery disease) ([address] Surgical History:   has a past surgical history that includes IR ANGIOPLASTY FEM POP W STENT. Social History:   reports that she quit smoking about 3 months ago. She smoked 0.50 packs per day. She has never used smokeless tobacco. She reports previous alcohol use. Family History:  family history includes Coronary Art Dis in her mother. Home Medications:  Current Outpatient Medications   Medication Sig Dispense Refill    clotrimazole (LOTRIMIN AF) 1 % cream Apply topically  daily. 28 g 1    ibuprofen (ADVIL;MOTRIN) 800 MG tablet TAKE ONE TABLET BY MOUTH THREE TIMES DAILY      gentamicin (GARAMYCIN) 0.1 % cream Apply topically daily.  30 g 1    lisinopril-hydroCHLOROthiazide (PRINZIDE;ZESTORETIC) 20-25 MG per tablet TAKE ONE TABLET BY MOUTH EVERY DAY      clopidogrel (PLAVIX) 75 MG tablet Take 75 mg by mouth daily      atorvastatin (LIPITOR) 10 MG tablet TAKE ONE TABLET BY MOUTH EVERY DAY      aspirin 81 MG EC tablet Take 81 mg by mouth daily      Potassium Chloride Jessie ER (KLOR-CON M20 PO) Take by mouth daily       No current facility-administered medications for this visit. Allergies:  Patient has no known allergies. Review of Systems:   · Constitutional: there has been no unanticipated weight loss. There's been no change in energy level, sleep pattern, or activity level. · Eyes: No visual changes or diplopia. No scleral icterus. · ENT: No Headaches, hearing loss or vertigo. No mouth sores or sore throat. · Cardiovascular: Reviewed in HPI  · Respiratory: No cough or wheezing, no sputum production. No hematemesis. · Gastrointestinal: No abdominal pain, appetite loss, blood in stools. No change in bowel or bladder habits. · Genitourinary: No dysuria, trouble voiding, or hematuria. · Musculoskeletal:  No gait disturbance, weakness or joint complaints. · Integumentary: No rash or pruritis. · Neurological: No headache, diplopia, change in muscle strength, numbness or tingling. No change in gait, balance, coordination, mood, affect, memory, mentation, behavior. · Psychiatric: No anxiety, no depression. · Endocrine: No malaise, fatigue or temperature intolerance. No excessive thirst, fluid intake, or urination. No tremor. · Hematologic/Lymphatic: No abnormal bruising or bleeding, blood clots or swollen lymph nodes. · Allergic/Immunologic: No nasal congestion or hives. Physical Examination:    There were no vitals filed for this visit. General appearance: alert, appears stated age, cooperative and no distress  Left foot warm. Biphasic AT and DP signal      MEDICAL DECISION MAKING/TESTING  I have reviewed the testing personally and my interpretation is below.     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 Plan for follow-up duplex imaging in 3 months to ensure continued patency. Thank you for allowing me to participate in the care of this individual.  Please do not hesitate to contact me with any questions. Malu Alfaro M.D., FACS.   4/27/2022  9:59 AM

## 2022-04-27 NOTE — LETTER
[facility]) - Vascular and Endovascular Surgeons  [facility] [address] [place] [id number]  Phone: [phone]  Fax: [phone]           Priscilla Lundy MD      April 27, 2022     Patient: Duncan Robin   MR Number: [id number]   YOB: 1945   Date of Visit: 4/27/2022       Dear Dr. Libra Galvez:    Thank you for referring Nohemi Cedeno to me for evaluation/treatment. Below are the relevant portions of my assessment and plan of care. If you have questions, please do not hesitate to call me. I look forward to following Cynthia Carrillo along with you.     Sincerely,        Priscilla Lundy MD    CC providers:  DO Samia Persaud [place] [address]  Via Fax: [phone]

## 2022-05-04 ENCOUNTER — HOSPITAL ENCOUNTER (OUTPATIENT)
Dept: WOUND CARE | Age: 77
Discharge: HOME | End: 2022-05-04
Payer: MEDICARE

## 2022-05-04 VITALS
TEMPERATURE: 97.7 F | RESPIRATION RATE: 16 BRPM | SYSTOLIC BLOOD PRESSURE: 141 MMHG | DIASTOLIC BLOOD PRESSURE: 75 MMHG | HEART RATE: 58 BPM

## 2022-05-04 DIAGNOSIS — L97.324 NON-PRESSURE CHRONIC ULCER OF LEFT ANKLE WITH NECROSIS OF BONE (HCC): Primary | ICD-10-CM

## 2022-05-04 PROCEDURE — 11043 DBRDMT MUSC&/FSCA 1ST 20/<: CPT

## 2022-05-04 RX ORDER — GINSENG 100 MG
CAPSULE ORAL ONCE
Status: CANCELLED | OUTPATIENT
Start: 2022-05-04 | End: 2022-05-04

## 2022-05-04 RX ORDER — CLOBETASOL PROPIONATE 0.5 MG/G
OINTMENT TOPICAL ONCE
Status: CANCELLED | OUTPATIENT
Start: 2022-05-04 | End: 2022-05-04

## 2022-05-04 RX ORDER — LIDOCAINE 40 MG/G
CREAM TOPICAL ONCE
Status: CANCELLED | OUTPATIENT
Start: 2022-05-04 | End: 2022-05-04

## 2022-05-04 RX ORDER — LIDOCAINE HYDROCHLORIDE 40 MG/ML
SOLUTION TOPICAL ONCE
Status: CANCELLED | OUTPATIENT
Start: 2022-05-04 | End: 2022-05-04

## 2022-05-04 RX ORDER — BACITRACIN, NEOMYCIN, POLYMYXIN B 400; 3.5; 5 [USP'U]/G; MG/G; [USP'U]/G
OINTMENT TOPICAL ONCE
Status: CANCELLED | OUTPATIENT
Start: 2022-05-04 | End: 2022-05-04

## 2022-05-04 RX ORDER — LIDOCAINE HYDROCHLORIDE 20 MG/ML
JELLY TOPICAL ONCE
Status: CANCELLED | OUTPATIENT
Start: 2022-05-04 | End: 2022-05-04

## 2022-05-04 RX ORDER — LIDOCAINE 50 MG/G
OINTMENT TOPICAL ONCE
Status: CANCELLED | OUTPATIENT
Start: 2022-05-04 | End: 2022-05-04

## 2022-05-04 RX ORDER — BACITRACIN ZINC AND POLYMYXIN B SULFATE 500; 1000 [USP'U]/G; [USP'U]/G
OINTMENT TOPICAL ONCE
Status: CANCELLED | OUTPATIENT
Start: 2022-05-04 | End: 2022-05-04

## 2022-05-04 RX ORDER — GENTAMICIN SULFATE 1 MG/G
OINTMENT TOPICAL ONCE
Status: CANCELLED | OUTPATIENT
Start: 2022-05-04 | End: 2022-05-04

## 2022-05-04 RX ORDER — LIDOCAINE 40 MG/G
CREAM TOPICAL ONCE
Status: DISCONTINUED | OUTPATIENT
Start: 2022-05-04 | End: 2022-05-05 | Stop reason: HOSPADM

## 2022-05-04 RX ORDER — BETAMETHASONE DIPROPIONATE 0.05 %
OINTMENT (GRAM) TOPICAL ONCE
Status: CANCELLED | OUTPATIENT
Start: 2022-05-04 | End: 2022-05-04

## 2022-05-04 NOTE — PLAN OF CARE
Discharge instructions given. Patient verbalized understanding. Return to [facility] in 2 week(s).

## 2022-05-04 NOTE — PROGRESS NOTES
[facility]   Progress Note and Procedure Note      Paras Izquierdo  AGE: 64 y.o. GENDER: female  : 1945  TODAY'S DATE:  2022    Subjective:     Chief Complaint   Patient presents with    Wound Check     Left lower extremity         HISTORY of PRESENT ILLNESS HPI     Paras Izquierdo is a 64 y.o. female who presents today for wound evaluation. History of Wound: Admits to having wound on her leg for months. She states she did not know was a sore until her primary care physician removed a scab. She states that it been hurting in the area for a while. She has had a revascularization with angiogram in December. Admits she still vaping some nicotine however she is trying to decrease. She is been changing the bandage as directed. She states most time she has no pain. She admits yesterday she did have a couple sharp pains. But other than that she has no other complaints. She denies current nausea, vomiting, fever, chills, shortness of breath or chest pain.      Wound Pain: Minimal  Severity: 2 / 10   Wound Type:  arterial and refractory osteomyelitis  Modifying Factors:  edema, venous stasis, lymphedema, arterial insufficiency, decreased tissue oxygenation and substance abuse  Associated Signs/Symptoms:  edema, drainage and pain        PAST MEDICAL HISTORY        Diagnosis Date    CAD (coronary artery disease)     Hyperlipidemia     Hypertension     PAD (peripheral artery disease) ([facility] [address]        PAST SURGICAL HISTORY    Past Surgical History:   Procedure Laterality Date    IR ANGXPTA FEM POP W STENT         FAMILY HISTORY    Family History   Problem Relation Age of Onset    Coronary Art Dis Mother        SOCIAL HISTORY    Social History     Tobacco Use    Smoking status: Former Smoker     Packs/day: 0.50     Quit date: 2022     Years since quittin.3    Smokeless tobacco: Never Used   Vaping Use    Vaping Use: Some days    Substances: Nicotine   Substance Use Topics    Alcohol use: Not Currently    Drug use: Not on file       ALLERGIES    No Known Allergies    MEDICATIONS    Current Outpatient Medications on File Prior to Encounter   Medication Sig Dispense Refill    ibuprofen (ADVIL;MOTRIN) 800 MG tablet TAKE ONE TABLET BY MOUTH THREE TIMES DAILY      gentamicin (GARAMYCIN) 0.1 % cream Apply topically daily. 30 g 1    lisinopril-hydroCHLOROthiazide (PRINZIDE;ZESTORETIC) 20-25 MG per tablet TAKE ONE TABLET BY MOUTH EVERY DAY      clopidogrel (PLAVIX) 75 MG tablet Take 75 mg by mouth daily      atorvastatin (LIPITOR) 10 MG tablet TAKE ONE TABLET BY MOUTH EVERY DAY      aspirin 81 MG EC tablet Take 81 mg by mouth daily      Potassium Chloride Jessie ER (KLOR-CON M20 PO) Take by mouth daily       No current facility-administered medications on file prior to encounter. REVIEW OF SYSTEMS    Pertinent items are noted in HPI. Objective:      BP (!) 141/75   Pulse 58   Temp 97.7 °F (36.5 °C) (Infrared)   Resp 16     PHYSICAL EXAM    Vascular: Vascular status Impaired  palpable pedal pulses, right DP0/4 and PT0/4, left DP0/4 and PT0/4. CFT 4 seconds digits 1 to 5 bilateral.  Hair growthAbsent  both lower extremities and feet. Skin temperature is warm to warm from pretibial area to distal digits bilateral.  Exam is negative for rubor, pallor, cyanosis or signs of acute vascular compromise bilaterally. Exam is positive for edema bilateral lower extremity. Varicosities Present bilateral lower extremity. Neuro: Neurologic status normal bilateral with epicritic Present , proprioceptive Present, vibratory sensationPresent and protopathicPresent. DTRs Present bilateral Achilles. There were no reproducible neuritic symptoms on exam bilateral feet/ankles. Derm: Ulceration to left ankle. Ecchymosis Absent  left feet/foot. Musculoskeletal: Pain with palpation and debridement of wound 5/5 muscle strength in/eversion and dorsi/plantarflexion bilateral feet. No gross instability noted. 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         Devitalized Tissue Debrided:  fibrin, slough, necrotic/eschar and exudate    Pre Debridement Measurements:  Are located in the Wound Documentation Flow Sheet    Wound #: 1     Wound Care Documentation:  Wound 01/05/22 Ankle Lateral;Left #1 (Active)   Wound Image   05/04/22 1352   Wound Etiology Arterial 05/04/22 1352   Wound Cleansed Cleansed with saline 05/04/22 1352   Dressing/Treatment Antibacterial ointment;Dry dressing;Triad hydro/zinc oxide-based hydrophilic paste 19/22/27 6656   Wound Length (cm) 0.8 cm 05/04/22 1352   Wound Width (cm) 0.3 cm 05/04/22 1352   Wound Depth (cm) 0.1 cm 05/04/22 1352   Wound Surface Area (cm^2) 0.24 cm^2 05/04/22 1352   Change in Wound Size % (l*w) 60 05/04/22 1352   Wound Volume (cm^3) 0.024 cm^3 05/04/22 1352   Wound Healing % 80 05/04/22 1352   Post-Procedure Length (cm) 0.8 cm 05/04/22 1402   Post-Procedure Width (cm) 0.2 cm 05/04/22 1402   Post-Procedure Depth (cm) 0.1 cm 05/04/22 1402   Post-Procedure Surface Area (cm^2) 0.16 cm^2 05/04/22 1402   Post-Procedure Volume (cm^3) 0.024 cm^3 05/04/22 1402   Wound Assessment Bleeding 05/04/22 1402   Drainage Amount Scant 05/04/22 1352   Drainage Description Yellow;Brown 05/04/22 1352   Odor None 05/04/22 1352   Tianna-wound Assessment Intact 05/04/22 1352   Margins Defined edges 05/04/22 1352   Wound Thickness Description not for Pressure Injury Full thickness 01/05/22 1018   Number of days: 119       Total Surface Area Debrided:  0.16 sq cm     Percentage of wound debrided 100%    Bleeding:  Minimal    Hemostasis Achieved:  by pressure    Procedural Pain:  4  / 10     Post Procedural Pain:  0 / 10     Response to treatment:  Well tolerated by patient. Plan:   Patient examined and evaluated   Wound size about the same but increase granulation tissue. 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 Patient would benefit from hyperbaric oxygen treatment given her chronic osteomyelitis. Prescription for clotrimazole to be used with the collagen every other day  Discussed acute versus chronic ischemia of the leg. She would benefit from hyperbaric oxygen treatment for her chronic wound and osteomyelitis. The nature of the patient's condition was explained in depth. The patient was informed that their compliance to the treatment plan is paramount to successful healing and prevention of further ulceration and/or infection.     Discharge Treatment  Daily dressing changes    Written Patient Discharge Instructions Given            Electronically signed by Lindsey Aguilera DPM on 5/4/2022 at 2:52 PM

## 2022-05-18 ENCOUNTER — HOSPITAL ENCOUNTER (OUTPATIENT)
Dept: WOUND CARE | Age: 77
Discharge: HOME | End: 2022-05-18
Payer: MEDICARE

## 2022-05-18 VITALS
DIASTOLIC BLOOD PRESSURE: 81 MMHG | TEMPERATURE: 97.5 F | SYSTOLIC BLOOD PRESSURE: 138 MMHG | RESPIRATION RATE: 16 BRPM | HEART RATE: 60 BPM

## 2022-05-18 DIAGNOSIS — L97.324 NON-PRESSURE CHRONIC ULCER OF LEFT ANKLE WITH NECROSIS OF BONE (HCC): Primary | ICD-10-CM

## 2022-05-18 PROCEDURE — 99213 OFFICE O/P EST LOW 20 MIN: CPT

## 2022-05-18 RX ORDER — LIDOCAINE 40 MG/G
CREAM TOPICAL ONCE
Status: DISCONTINUED | OUTPATIENT
Start: 2022-05-18 | End: 2022-05-19 | Stop reason: HOSPADM

## 2022-05-18 RX ORDER — BACITRACIN, NEOMYCIN, POLYMYXIN B 400; 3.5; 5 [USP'U]/G; MG/G; [USP'U]/G
OINTMENT TOPICAL ONCE
OUTPATIENT
Start: 2022-05-18 | End: 2022-05-18

## 2022-05-18 RX ORDER — LIDOCAINE HYDROCHLORIDE 20 MG/ML
JELLY TOPICAL ONCE
OUTPATIENT
Start: 2022-05-18 | End: 2022-05-18

## 2022-05-18 RX ORDER — BACITRACIN ZINC AND POLYMYXIN B SULFATE 500; 1000 [USP'U]/G; [USP'U]/G
OINTMENT TOPICAL ONCE
OUTPATIENT
Start: 2022-05-18 | End: 2022-05-18

## 2022-05-18 RX ORDER — BETAMETHASONE DIPROPIONATE 0.05 %
OINTMENT (GRAM) TOPICAL ONCE
OUTPATIENT
Start: 2022-05-18 | End: 2022-05-18

## 2022-05-18 RX ORDER — GINSENG 100 MG
CAPSULE ORAL ONCE
OUTPATIENT
Start: 2022-05-18 | End: 2022-05-18

## 2022-05-18 RX ORDER — LIDOCAINE 40 MG/G
CREAM TOPICAL ONCE
Status: CANCELLED | OUTPATIENT
Start: 2022-05-18 | End: 2022-05-18

## 2022-05-18 RX ORDER — LIDOCAINE HYDROCHLORIDE 40 MG/ML
SOLUTION TOPICAL ONCE
OUTPATIENT
Start: 2022-05-18 | End: 2022-05-18

## 2022-05-18 RX ORDER — LIDOCAINE 50 MG/G
OINTMENT TOPICAL ONCE
OUTPATIENT
Start: 2022-05-18 | End: 2022-05-18

## 2022-05-18 RX ORDER — GENTAMICIN SULFATE 1 MG/G
OINTMENT TOPICAL ONCE
OUTPATIENT
Start: 2022-05-18 | End: 2022-05-18

## 2022-05-18 RX ORDER — CLOBETASOL PROPIONATE 0.5 MG/G
OINTMENT TOPICAL ONCE
OUTPATIENT
Start: 2022-05-18 | End: 2022-05-18

## 2022-05-18 NOTE — PLAN OF CARE
Discharge instructions given. Patient verbalized understanding. Return to [facility] in 2 week(s).

## 2022-05-18 NOTE — PROGRESS NOTES
[facility]   Progress Note and Procedure Note      Alona Langley  AGE: 64 y.o. GENDER: female  : 1945  TODAY'S DATE:  2022    Subjective:     Chief Complaint   Patient presents with    Wound Check     Left lower extremity         HISTORY of PRESENT ILLNESS HPI     Alona Langley is a 64 y.o. female who presents today for wound evaluation. History of Wound: Admits to having wound on her leg for months. She states she did not know was a sore until her primary care physician removed a scab. She states that it been hurting in the area for a while. She has had a revascularization with angiogram in December. She states that her pain has resolved. Admits she still vaping some nicotine however she is trying to decrease. She is been changing the bandage as directed. She states most time she has no pain. She denies current nausea, vomiting, fever, chills, shortness of breath or chest pain.      Wound Pain: Minimal  Severity: 0 / 10   Wound Type:  arterial and refractory osteomyelitis  Modifying Factors:  edema, venous stasis, lymphedema, arterial insufficiency, decreased tissue oxygenation and substance abuse  Associated Signs/Symptoms:  edema, drainage and pain        PAST MEDICAL HISTORY        Diagnosis Date    CAD (coronary artery disease)     Hyperlipidemia     Hypertension     PAD (peripheral artery disease) ([facility] [address]        PAST SURGICAL HISTORY    Past Surgical History:   Procedure Laterality Date    IR ANGXPTA FEM POP W STENT         FAMILY HISTORY    Family History   Problem Relation Age of Onset    Coronary Art Dis Mother        SOCIAL HISTORY    Social History     Tobacco Use    Smoking status: Former Smoker     Packs/day: 0.50     Quit date: 2022     Years since quittin.3    Smokeless tobacco: Never Used   Vaping Use    Vaping Use: Some days    Substances: Nicotine   Substance Use Topics    Alcohol use: Not Currently    Drug use: Not on file ALLERGIES    No Known Allergies    MEDICATIONS    Current Outpatient Medications on File Prior to Encounter   Medication Sig Dispense Refill    ibuprofen (ADVIL;MOTRIN) 800 MG tablet TAKE ONE TABLET BY MOUTH THREE TIMES DAILY      gentamicin (GARAMYCIN) 0.1 % cream Apply topically daily. 30 g 1    lisinopril-hydroCHLOROthiazide (PRINZIDE;ZESTORETIC) 20-25 MG per tablet TAKE ONE TABLET BY MOUTH EVERY DAY      clopidogrel (PLAVIX) 75 MG tablet Take 75 mg by mouth daily      atorvastatin (LIPITOR) 10 MG tablet TAKE ONE TABLET BY MOUTH EVERY DAY      aspirin 81 MG EC tablet Take 81 mg by mouth daily      Potassium Chloride Jessie ER (KLOR-CON M20 PO) Take by mouth daily       No current facility-administered medications on file prior to encounter. REVIEW OF SYSTEMS    Pertinent items are noted in HPI. Objective:      /81   Pulse 60   Temp 97.5 °F (36.4 °C) (Infrared)   Resp 16     PHYSICAL EXAM    Vascular: Vascular status Impaired  palpable pedal pulses, right DP0/4 and PT0/4, left DP0/4 and PT0/4. CFT 4 seconds digits 1 to 5 bilateral.  Hair growthAbsent  both lower extremities and feet. Skin temperature is warm to warm from pretibial area to distal digits bilateral.  Exam is negative for rubor, pallor, cyanosis or signs of acute vascular compromise bilaterally. Exam is positive for edema bilateral lower extremity. Varicosities Present bilateral lower extremity. Neuro: Neurologic status normal bilateral with epicritic Present , proprioceptive Present, vibratory sensationPresent and protopathicPresent. DTRs Present bilateral Achilles. There were no reproducible neuritic symptoms on exam bilateral feet/ankles. Derm: Ulceration to left ankle. Ecchymosis Absent  left feet/foot. Musculoskeletal: Pain with palpation and debridement of wound 5/5 muscle strength in/eversion and dorsi/plantarflexion bilateral feet. No gross instability noted.       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 ([address] - Primary    Relevant Medications    lidocaine (LMX) 4 % cream    Other Relevant Orders    Initiate Outpatient Wound Care Protocol        Wound Care Documentation:  Wound 01/05/22 Ankle Lateral;Left #1 (Active)   Wound Image   05/04/22 1352   Wound Etiology Arterial 05/18/22 1435   Wound Cleansed Cleansed with saline 05/18/22 1435   Dressing/Treatment Antibacterial ointment;Dry dressing;Triad hydro/zinc oxide-based hydrophilic paste 49/27/60 3287   Wound Length (cm) 0.7 cm 05/18/22 1435   Wound Width (cm) 0.3 cm 05/18/22 1435   Wound Depth (cm) 0.1 cm 05/18/22 1435   Wound Surface Area (cm^2) 0.21 cm^2 05/18/22 1435   Change in Wound Size % (l*w) 65 05/18/22 1435   Wound Volume (cm^3) 0.021 cm^3 05/18/22 1435   Wound Healing % 82 05/18/22 1435   Post-Procedure Length (cm) 0.8 cm 05/04/22 1402   Post-Procedure Width (cm) 0.3 cm 05/04/22 1402   Post-Procedure Depth (cm) 0.1 cm 05/04/22 1402   Post-Procedure Surface Area (cm^2) 0.24 cm^2 05/04/22 1402   Post-Procedure Volume (cm^3) 0.024 cm^3 05/04/22 1402   Wound Assessment [place]/red;Slough 05/18/22 1435   Drainage Amount Scant 05/18/22 1435   Drainage Description Yellow 05/18/22 1435   Odor None 05/18/22 1435   Tianna-wound Assessment Intact 05/18/22 1435   Margins Defined edges 05/04/22 1352   Wound Thickness Description not for Pressure Injury Full thickness 01/05/22 1018   Number of days: 133         Plan:   Patient examined and evaluated   Wound improving  Wound sharply debrided without incident   Discussed appropriate diet   Recommend ceasing use of nicotine vape given her thromboangiitis obliterans/ Raynaud's  Daily dressing changes with gentamicin cream and triad recommend that her friend help her change the bandage. Patient would benefit from hyperbaric oxygen treatment given her chronic osteomyelitis.   Prescription for clotrimazole to be used with the collagen every other day  Discussed acute versus chronic ischemia of the leg. She would benefit from hyperbaric oxygen treatment for her chronic wound and osteomyelitis. The nature of the patient's condition was explained in depth. The patient was informed that their compliance to the treatment plan is paramount to successful healing and prevention of further ulceration and/or infection.     Discharge Treatment  Daily dressing changes    Written Patient Discharge Instructions Given            Electronically signed by Carlotta Sanabria DPM on 5/18/2022 at 3:42 PM

## 2022-06-15 ENCOUNTER — HOSPITAL ENCOUNTER (OUTPATIENT)
Dept: WOUND CARE | Age: 77
Discharge: HOME | End: 2022-06-15
Payer: MEDICARE

## 2022-06-15 VITALS
DIASTOLIC BLOOD PRESSURE: 76 MMHG | SYSTOLIC BLOOD PRESSURE: 118 MMHG | RESPIRATION RATE: 16 BRPM | TEMPERATURE: 97.3 F | HEART RATE: 57 BPM

## 2022-06-15 DIAGNOSIS — L97.324 NON-PRESSURE CHRONIC ULCER OF LEFT ANKLE WITH NECROSIS OF BONE (HCC): Primary | ICD-10-CM

## 2022-06-15 PROCEDURE — 99213 OFFICE O/P EST LOW 20 MIN: CPT

## 2022-06-15 RX ORDER — LIDOCAINE HYDROCHLORIDE 40 MG/ML
SOLUTION TOPICAL ONCE
OUTPATIENT
Start: 2022-06-15 | End: 2022-06-15

## 2022-06-15 RX ORDER — LIDOCAINE 40 MG/G
CREAM TOPICAL ONCE
Status: DISCONTINUED | OUTPATIENT
Start: 2022-06-15 | End: 2022-06-16 | Stop reason: HOSPADM

## 2022-06-15 RX ORDER — CLOBETASOL PROPIONATE 0.5 MG/G
OINTMENT TOPICAL ONCE
OUTPATIENT
Start: 2022-06-15 | End: 2022-06-15

## 2022-06-15 RX ORDER — BACITRACIN ZINC AND POLYMYXIN B SULFATE 500; 1000 [USP'U]/G; [USP'U]/G
OINTMENT TOPICAL ONCE
OUTPATIENT
Start: 2022-06-15 | End: 2022-06-15

## 2022-06-15 RX ORDER — GENTAMICIN SULFATE 1 MG/G
OINTMENT TOPICAL ONCE
OUTPATIENT
Start: 2022-06-15 | End: 2022-06-15

## 2022-06-15 RX ORDER — LIDOCAINE HYDROCHLORIDE 20 MG/ML
JELLY TOPICAL ONCE
OUTPATIENT
Start: 2022-06-15 | End: 2022-06-15

## 2022-06-15 RX ORDER — BETAMETHASONE DIPROPIONATE 0.05 %
OINTMENT (GRAM) TOPICAL ONCE
OUTPATIENT
Start: 2022-06-15 | End: 2022-06-15

## 2022-06-15 RX ORDER — LIDOCAINE 50 MG/G
OINTMENT TOPICAL ONCE
OUTPATIENT
Start: 2022-06-15 | End: 2022-06-15

## 2022-06-15 RX ORDER — GINSENG 100 MG
CAPSULE ORAL ONCE
OUTPATIENT
Start: 2022-06-15 | End: 2022-06-15

## 2022-06-15 RX ORDER — CLOTRIMAZOLE AND BETAMETHASONE DIPROPIONATE 10; .64 MG/G; MG/G
CREAM TOPICAL
Qty: 45 G | Refills: 1 | Status: SHIPPED | OUTPATIENT
Start: 2022-06-15

## 2022-06-15 RX ORDER — LIDOCAINE 40 MG/G
CREAM TOPICAL ONCE
OUTPATIENT
Start: 2022-06-15 | End: 2022-06-15

## 2022-06-15 RX ORDER — BACITRACIN, NEOMYCIN, POLYMYXIN B 400; 3.5; 5 [USP'U]/G; MG/G; [USP'U]/G
OINTMENT TOPICAL ONCE
OUTPATIENT
Start: 2022-06-15 | End: 2022-06-15

## 2022-06-15 NOTE — PLAN OF CARE
Discharge instructions given. Patient verbalized understanding.   Return to [facility] when return from [place]

## 2022-06-15 NOTE — PROGRESS NOTES
[facility]   Progress Note and Procedure Note      Dilan Israel  AGE: 64 y.o. GENDER: female  : 1945  TODAY'S DATE:  6/15/2022    Subjective:     Chief Complaint   Patient presents with    Wound Check     Left ankle         HISTORY of PRESENT ILLNESS HPI     Dilan Israel is a 64 y.o. female who presents today for wound evaluation. History of Wound: Admits to having wound on her leg for months. She states she did not know was a sore until her primary care physician removed a scab. She states that it been hurting in the area for a while. She has had a revascularization with angiogram in December. She is still using nicotine. She has no other complaints at this time. She has been changing the bandage as directed. There is little dry skin on her anterior ankle she states most time she has no pain. She denies current nausea, vomiting, fever, chills, shortness of breath or chest pain.      Wound Pain: Minimal  Severity: 0 / 10   Wound Type:  arterial and refractory osteomyelitis  Modifying Factors:  edema, venous stasis, lymphedema, arterial insufficiency, decreased tissue oxygenation and substance abuse  Associated Signs/Symptoms:  edema, drainage and pain        PAST MEDICAL HISTORY        Diagnosis Date    CAD (coronary artery disease)     Hyperlipidemia     Hypertension     PAD (peripheral artery disease) ([facility] [address]        PAST SURGICAL HISTORY    Past Surgical History:   Procedure Laterality Date    IR ANGXPTA FEM POP W STENT         FAMILY HISTORY    Family History   Problem Relation Age of Onset    Coronary Art Dis Mother        SOCIAL HISTORY    Social History     Tobacco Use    Smoking status: Former Smoker     Packs/day: 0.50     Quit date: 2022     Years since quittin.4    Smokeless tobacco: Never Used   Vaping Use    Vaping Use: Some days    Substances: Nicotine   Substance Use Topics    Alcohol use: Not Currently    Drug use: Not on file ALLERGIES    No Known Allergies    MEDICATIONS    Current Outpatient Medications on File Prior to Encounter   Medication Sig Dispense Refill    ibuprofen (ADVIL;MOTRIN) 800 MG tablet TAKE ONE TABLET BY MOUTH THREE TIMES DAILY      gentamicin (GARAMYCIN) 0.1 % cream Apply topically daily. 30 g 1    lisinopril-hydroCHLOROthiazide (PRINZIDE;ZESTORETIC) 20-25 MG per tablet TAKE ONE TABLET BY MOUTH EVERY DAY      clopidogrel (PLAVIX) 75 MG tablet Take 75 mg by mouth daily      atorvastatin (LIPITOR) 10 MG tablet TAKE ONE TABLET BY MOUTH EVERY DAY      aspirin 81 MG EC tablet Take 81 mg by mouth daily      Potassium Chloride Jessie ER (KLOR-CON M20 PO) Take by mouth daily       No current facility-administered medications on file prior to encounter. REVIEW OF SYSTEMS    Pertinent items are noted in HPI. Objective:      /76   Pulse 57   Temp 97.3 °F (36.3 °C) (Infrared)   Resp 16     PHYSICAL EXAM    Vascular: Vascular status Impaired  palpable pedal pulses, right DP0/4 and PT0/4, left DP0/4 and PT0/4. CFT 4 seconds digits 1 to 5 bilateral.  Hair growthAbsent  both lower extremities and feet. Skin temperature is warm to warm from pretibial area to distal digits bilateral.  Exam is negative for rubor, pallor, cyanosis or signs of acute vascular compromise bilaterally. Exam is positive for edema bilateral lower extremity. Varicosities Present bilateral lower extremity. Neuro: Neurologic status normal bilateral with epicritic Present , proprioceptive Present, vibratory sensationPresent and protopathicPresent. DTRs Present bilateral Achilles. There were no reproducible neuritic symptoms on exam bilateral feet/ankles. Derm: Ulceration to left ankle healed. Ecchymosis Absent  bilateral feet/foot. Musculoskeletal: Pain with palpation and debridement of wound 5/5 muscle strength in/eversion and dorsi/plantarflexion bilateral feet. No gross instability noted.       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 Scription for Lotrisone for the next 2 weeks. The patient was informed that their compliance to the treatment plan is paramount to successful healing and prevention of further ulceration and/or infection.     Discharge Treatment  Daily dressing changes    Written Patient Discharge Instructions Given            Electronically signed by Mohsen Barrow DPM on 6/15/2022 at 3:19 PM

## 2022-07-27 ENCOUNTER — OFFICE VISIT (OUTPATIENT)
Dept: VASCULAR SURGERY | Age: 77
End: 2022-07-27
Payer: MEDICARE

## 2022-07-27 ENCOUNTER — PROCEDURE VISIT (OUTPATIENT)
Dept: VASCULAR SURGERY | Age: 77
End: 2022-07-27

## 2022-07-27 VITALS
BODY MASS INDEX: 16.8 KG/M2 | DIASTOLIC BLOOD PRESSURE: 70 MMHG | SYSTOLIC BLOOD PRESSURE: 120 MMHG | HEIGHT: 64 IN | WEIGHT: 98.4 LBS

## 2022-07-27 DIAGNOSIS — I70.213 ATHEROSCLEROSIS OF NATIVE ARTERIES OF EXTREMITIES WITH INTERMITTENT CLAUDICATION, BILATERAL LEGS (HCC): Primary | ICD-10-CM

## 2022-07-27 DIAGNOSIS — I70.213 ATHEROSCLEROSIS OF NATIVE ARTERIES OF EXTREMITIES WITH INTERMITTENT CLAUDICATION, BILATERAL LEGS (HCC): ICD-10-CM

## 2022-07-27 PROCEDURE — 1036F TOBACCO NON-USER: CPT | Performed by: SURGERY

## 2022-07-27 PROCEDURE — G8419 CALC BMI OUT NRM PARAM NOF/U: HCPCS | Performed by: SURGERY

## 2022-07-27 PROCEDURE — G8400 PT W/DXA NO RESULTS DOC: HCPCS | Performed by: SURGERY

## 2022-07-27 PROCEDURE — 1123F ACP DISCUSS/DSCN MKR DOCD: CPT | Performed by: SURGERY

## 2022-07-27 PROCEDURE — G8427 DOCREV CUR MEDS BY ELIG CLIN: HCPCS | Performed by: SURGERY

## 2022-07-27 PROCEDURE — 1090F PRES/ABSN URINE INCON ASSESS: CPT | Performed by: SURGERY

## 2022-07-27 PROCEDURE — 99213 OFFICE O/P EST LOW 20 MIN: CPT | Performed by: SURGERY

## 2022-07-27 NOTE — PROGRESS NOTES
[facility])   Vascular Surgery Followup    Referring Provider:  Prasanna Beck DO     No chief complaint on file. History of Present Illness:  64-year-old female here today for follow-up with known history of [place] stage V peripheral vascular disease of the left lower extremity. She was discharged from the wound clinic [facility] with a healed left lower extremity wound. History of left SFA intervention in December 2021. She presents today for routine duplex imaging and to discuss results. She states all wounds have healed. She denies any pain or discomfort with walking. Past Medical History:   has a past medical history of CAD (coronary artery disease), Hyperlipidemia, Hypertension, and PAD (peripheral artery disease) ([address] Surgical History:   has a past surgical history that includes IR ANGIOPLASTY FEM POP W STENT. Social History:   reports that she quit smoking about 6 months ago. She smoked an average of .5 packs per day. She has never used smokeless tobacco. She reports that she does not currently use alcohol. Family History:  family history includes Coronary Art Dis in her mother. Home Medications:  Current Outpatient Medications   Medication Sig Dispense Refill    clotrimazole-betamethasone (LOTRISONE) 1-0.05 % cream Apply topically 2 times daily. 45 g 1    ibuprofen (ADVIL;MOTRIN) 800 MG tablet TAKE ONE TABLET BY MOUTH THREE TIMES DAILY      gentamicin (GARAMYCIN) 0.1 % cream Apply topically daily. 30 g 1    lisinopril-hydroCHLOROthiazide (PRINZIDE;ZESTORETIC) 20-25 MG per tablet TAKE ONE TABLET BY MOUTH EVERY DAY      clopidogrel (PLAVIX) 75 MG tablet Take 75 mg by mouth daily      atorvastatin (LIPITOR) 10 MG tablet TAKE ONE TABLET BY MOUTH EVERY DAY      aspirin 81 MG EC tablet Take 81 mg by mouth daily      Potassium Chloride Jessie ER (KLOR-CON M20 PO) Take by mouth daily       No current facility-administered medications for this visit.        Allergies:  Patient has no known allergies. Review of Systems:   Constitutional: there has been no unanticipated weight loss. There's been no change in energy level, sleep pattern, or activity level. Eyes: No visual changes or diplopia. No scleral icterus. ENT: No Headaches, hearing loss or vertigo. No mouth sores or sore throat. Cardiovascular: Reviewed in HPI  Respiratory: No cough or wheezing, no sputum production. No hematemesis. Gastrointestinal: No abdominal pain, appetite loss, blood in stools. No change in bowel or bladder habits. Genitourinary: No dysuria, trouble voiding, or hematuria. Musculoskeletal:  No gait disturbance, weakness or joint complaints. Integumentary: No rash or pruritis. Neurological: No headache, diplopia, change in muscle strength, numbness or tingling. No change in gait, balance, coordination, mood, affect, memory, mentation, behavior. Psychiatric: No anxiety, no depression. Endocrine: No malaise, fatigue or temperature intolerance. No excessive thirst, fluid intake, or urination. No tremor. Hematologic/Lymphatic: No abnormal bruising or bleeding, blood clots or swollen lymph nodes. Allergic/Immunologic: No nasal congestion or hives. Physical Examination:    There were no vitals filed for this visit. General appearance: alert, appears stated age, cooperative, and no distress  Head: Normocephalic, without obvious abnormality, atraumatic  Neck: no adenopathy, no carotid bruit, no JVD, supple, symmetrical, trachea midline, and thyroid: not enlarged, symmetric, no tenderness/mass/nodules  Lungs: clear to auscultation bilaterally  Heart: regular rate and rhythm, S1, S2 normal, no murmur, click, rub or gallop  Abdomen: soft, non-tender. Bowel sounds normal. No masses,  no organomegaly  Extremities: extremities normal, atraumatic, no cyanosis or edema    Pulses: Biphasic  MEDICAL DECISION MAKING/TESTING  I have reviewed the testing personally and my interpretation is below.     Right RON 0.36 left RON 1.16 with occlusion of the left posterior tibial artery. 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 She has good distal flow today. Ultrasound very reassuring. She does have moderate to severe disease on the right however is asymptomatic at this time. We will plan for repeat duplex in 6 months    Thank you for allowing me to participate in the care of this individual.  Please do not hesitate to contact me with any questions. Yareli Justice M.D., FACS.   7/27/2022  9:03 AM

## 2022-07-27 NOTE — LETTER
[facility]) - Vascular and Endovascular Surgeons  [facility] [address] [place] [address]  Phone: [phone]  Fax: [phone]           Dulce Ospina MD      July 27, 2022     Patient: Britta Haddad   MR Number: [id number]   YOB: 1945   Date of Visit: 7/27/2022       Dear Dr. Jennifer Jimenez:    Thank you for referring Roxanne Greer to me for evaluation/treatment. Below are the relevant portions of my assessment and plan of care. If you have questions, please do not hesitate to call me. I look forward to following Casper James along with you.     Sincerely,        Dulce Ospina MD    CC providers:  Angie Vizcarra DO  [address] [place] [id number]  Via Fax: [phone]

## 2023-05-03 ENCOUNTER — TELEPHONE (OUTPATIENT)
Dept: SURGERY | Age: 78
End: 2023-05-03

## 2023-05-03 NOTE — TELEPHONE ENCOUNTER
Attempted LM to schedule BLE ADS in FF office after 7/27/23. Memory full on voice mail. Test only, Dr Hemalatha Santizo will call with the results.

## 2023-08-01 ENCOUNTER — PROCEDURE VISIT (OUTPATIENT)
Dept: VASCULAR SURGERY | Age: 78
End: 2023-08-01
Payer: MEDICARE

## 2023-08-01 DIAGNOSIS — I73.9 PVD (PERIPHERAL VASCULAR DISEASE) WITH CLAUDICATION (HCC): Primary | ICD-10-CM

## 2023-08-01 PROCEDURE — 93925 LOWER EXTREMITY STUDY: CPT | Performed by: SURGERY

## 2023-08-16 ENCOUNTER — TELEPHONE (OUTPATIENT)
Dept: VASCULAR SURGERY | Age: 78
End: 2023-08-16

## 2023-08-16 DIAGNOSIS — I70.213 ATHEROSCLEROSIS OF NATIVE ARTERIES OF EXTREMITIES WITH INTERMITTENT CLAUDICATION, BILATERAL LEGS (HCC): Primary | ICD-10-CM

## 2023-08-16 NOTE — TELEPHONE ENCOUNTER
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 Patient denies any recurrent wounds. She does report some swelling and bruising to her left shin after hitting it about a month or two ago. Recommend light ACE wrap and elevation and instructed to call with worsening symptoms. Will plan to repeat BLE arterial duplex in  1 year for surveillance.     Electronically signed by AMAN Bryant CNP on 8/16/2023 at 10:06 AM

## 2024-06-04 NOTE — PATIENT INSTRUCTIONS
[facility]  [address]   [place], [place] [address]  Telephone: [phone]     FAX [phone]    Discharge Instructions    Important reminders:    **If you have any signs and symptoms of illness (Cough, fever, congestion, nausea, vomiting, diarrhea, etc.) please call the wound care center prior to your appointment.    1. Increase Protein intake for optimal wound healing  2. No added salt to reduce any swelling  3. If diabetic, maintain good glucose control  4. If you smoke, smoking prohibits wound healing, we ask that you refrain from smoking.  5. When taking antibiotics take the entire prescription as ordered. Do not stop taking until medication is all gone unless otherwise instructed.   6. Exercise as tolerated.   7. Keep weight off wounds and reposition every 2 hours if applicable.  8. If wound(s) is on your lower extremity, elevate legs to the level of the heart or above for 30 minutes 4-5 times a day and/or when sitting. Avoid standing for long periods of time.   9. Do not get wounds wet in bath or shower unless otherwise instructed by your physician. If your wound is on your foot or leg, you may purchase a cast bag. Please ask at the pharmacy.      If Vascular testing is ordered, please call [phone] to schedule.    Vascular tests ordered by Wound Care Physicians may take up to 2 hours to complete. Please keep that in mind when scheduling.     If Vascular testing is scheduled, please bring supplies to replace your dressing after testing is done. The vascular department does not stock supplies.     Wound:  Right leg    With each dressing change, rinse wounds with 0.9% Saline. (May use wound wash or soft contact solution. Both can be purchased at a local drug store). If unable to obtain saline, may use a gentle soap and water.    Dressing care: Mix gentamicin cream and triad (give pt a tube), dry dressing , 2 tubigrips    Home Care Agency/Facility:     Your wound-care supplies

## 2024-06-05 ENCOUNTER — HOSPITAL ENCOUNTER (OUTPATIENT)
Dept: WOUND CARE | Age: 79
Discharge: HOME | End: 2024-06-05
Attending: PODIATRIST
Payer: MEDICARE

## 2024-06-05 VITALS
DIASTOLIC BLOOD PRESSURE: 74 MMHG | HEART RATE: 70 BPM | RESPIRATION RATE: 18 BRPM | TEMPERATURE: 98.1 F | SYSTOLIC BLOOD PRESSURE: 125 MMHG

## 2024-06-05 DIAGNOSIS — L97.324 NON-PRESSURE CHRONIC ULCER OF LEFT ANKLE WITH NECROSIS OF BONE (HCC): Primary | ICD-10-CM

## 2024-06-05 DIAGNOSIS — L97.313 NON-PRESSURE CHRONIC ULCER OF RIGHT ANKLE WITH NECROSIS OF MUSCLE (HCC): ICD-10-CM

## 2024-06-05 DIAGNOSIS — I73.89 OTHER SPECIFIED PERIPHERAL VASCULAR DISEASES (HCC): ICD-10-CM

## 2024-06-05 DIAGNOSIS — I70.245 ATHEROSCLEROSIS OF NATIVE ARTERIES OF LEFT LEG WITH ULCERATION OF OTHER PART OF FOOT (HCC): ICD-10-CM

## 2024-06-05 PROCEDURE — 11043 DBRDMT MUSC&/FSCA 1ST 20/<: CPT

## 2024-06-05 PROCEDURE — 99213 OFFICE O/P EST LOW 20 MIN: CPT

## 2024-06-05 RX ORDER — LIDOCAINE HYDROCHLORIDE 20 MG/ML
JELLY TOPICAL ONCE
OUTPATIENT
Start: 2024-06-05 | End: 2024-06-05

## 2024-06-05 RX ORDER — BACITRACIN ZINC AND POLYMYXIN B SULFATE 500; 1000 [USP'U]/G; [USP'U]/G
OINTMENT TOPICAL ONCE
OUTPATIENT
Start: 2024-06-05 | End: 2024-06-05

## 2024-06-05 RX ORDER — BETAMETHASONE DIPROPIONATE 0.5 MG/G
CREAM TOPICAL ONCE
OUTPATIENT
Start: 2024-06-05 | End: 2024-06-05

## 2024-06-05 RX ORDER — CLOBETASOL PROPIONATE 0.5 MG/G
OINTMENT TOPICAL ONCE
OUTPATIENT
Start: 2024-06-05 | End: 2024-06-05

## 2024-06-05 RX ORDER — LIDOCAINE 50 MG/G
OINTMENT TOPICAL ONCE
OUTPATIENT
Start: 2024-06-05 | End: 2024-06-05

## 2024-06-05 RX ORDER — GENTAMICIN SULFATE 1 MG/G
OINTMENT TOPICAL ONCE
OUTPATIENT
Start: 2024-06-05 | End: 2024-06-05

## 2024-06-05 RX ORDER — LIDOCAINE 40 MG/G
CREAM TOPICAL ONCE
OUTPATIENT
Start: 2024-06-05 | End: 2024-06-05

## 2024-06-05 RX ORDER — TRIAMCINOLONE ACETONIDE 1 MG/G
OINTMENT TOPICAL ONCE
OUTPATIENT
Start: 2024-06-05 | End: 2024-06-05

## 2024-06-05 RX ORDER — LIDOCAINE HYDROCHLORIDE 40 MG/ML
SOLUTION TOPICAL ONCE
OUTPATIENT
Start: 2024-06-05 | End: 2024-06-05

## 2024-06-05 RX ORDER — GINSENG 100 MG
CAPSULE ORAL ONCE
OUTPATIENT
Start: 2024-06-05 | End: 2024-06-05

## 2024-06-05 RX ORDER — GENTAMICIN SULFATE 1 MG/G
CREAM TOPICAL
Qty: 30 G | Refills: 1 | Status: SHIPPED | OUTPATIENT
Start: 2024-06-05

## 2024-06-05 RX ORDER — IBUPROFEN 200 MG
TABLET ORAL ONCE
OUTPATIENT
Start: 2024-06-05 | End: 2024-06-05

## 2024-06-05 RX ORDER — SODIUM CHLOR/HYPOCHLOROUS ACID 0.033 %
SOLUTION, IRRIGATION IRRIGATION ONCE
OUTPATIENT
Start: 2024-06-05 | End: 2024-06-05

## 2024-06-05 ASSESSMENT — PAIN DESCRIPTION - ORIENTATION: ORIENTATION: RIGHT

## 2024-06-05 ASSESSMENT — PAIN SCALES - GENERAL: PAINLEVEL_OUTOF10: 9

## 2024-06-05 ASSESSMENT — PAIN DESCRIPTION - DESCRIPTORS: DESCRIPTORS: ACHING;SHARP

## 2024-06-05 ASSESSMENT — PAIN DESCRIPTION - LOCATION: LOCATION: LEG

## 2024-06-05 NOTE — PROGRESS NOTES
[facility]  Progress Note and Procedure Note      Jessica Nails  AGE: 78 y.o.   GENDER: female  : 1945  TODAY'S DATE:  2024    Subjective:     Chief Complaint   Patient presents with    Wound Check     Right lower leg         HISTORY of PRESENT ILLNESS HPI     Jessica Nails is a 78 y.o. female who presents today for wound evaluation.  She is known to us from previous wound on her left leg and has had previous vascular intervention.    History of Wound: She states that she has new wound on her right ankle from a dog leash that wrap around her leg and caused a wound.  She has been treating herself for 3 weeks.  She states that the wound has not been improving.  She denies current nausea, vomiting, fever, chills, shortness of breath or chest pain.  Wound Pain:  moderate  Severity:  5 / 10   Wound Type:  arterial and refractory osteomyelitis  Modifying Factors:  edema, venous stasis, lymphedema, arterial insufficiency, decreased tissue oxygenation and substance abuse  Associated Signs/Symptoms:  edema, drainage and pain        PAST MEDICAL HISTORY        Diagnosis Date    CAD (coronary artery disease)     Hyperlipidemia     Hypertension     PAD (peripheral artery disease) (HCC)        PAST SURGICAL HISTORY    Past Surgical History:   Procedure Laterality Date    IR ANGXPTA FEM POP W STENT         FAMILY HISTORY    Family History   Problem Relation Age of Onset    Coronary Art Dis Mother        SOCIAL HISTORY    Social History     Tobacco Use    Smoking status: Former     Current packs/day: 0.00     Types: Cigarettes     Quit date: 2022     Years since quittin.3    Smokeless tobacco: Never   Vaping Use    Vaping Use: Some days    Substances: Nicotine   Substance Use Topics    Alcohol use: Not Currently       ALLERGIES    No Known Allergies    MEDICATIONS    Current Outpatient Medications on File Prior to Encounter   Medication Sig Dispense Refill

## 2024-06-05 NOTE — PLAN OF CARE
Discharge instructions given.  Patient verbalized understanding.  Return to [facility] in 1 week(s).

## 2024-06-05 NOTE — PLAN OF CARE
Wound Care Supplies      Supply Company:     [facility] [address] [facility] [address] [place] [address]  p: [phone] f: [phone]                                                                                                                                                                                                                                                                                                                                                                                                Ordering Center:    [facility]  [address]  [facility] [address]  [phone]  Dept: [phone]   Fax# [phone]    Patient Demographics:     Garry Nails  [address] [facility] [address]   [phone]   : 1945  AGE: 78 y.o.     GENDER: female   PATIENT EMAIL: [email or web address]  TODAYS DATE:  2024      Insurance Information:     PRIMARY INSURANCE:  Plan: HUMANA GOLD PLUS HMO  Coverage: [employer] MEDICARE  Effective Date: 2021  Group Number: [[id number]]  Subscriber Number: [id number] - (Medicare Managed)    Payer/Plan Subscr  Sex Relation Sub. Ins. ID Effective Group Num   1. HUMANA MEDICA* GARRY NAILS 1945 Female Self [id number]                                   [address] [address]   2. 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

## 2024-06-10 NOTE — PATIENT INSTRUCTIONS
[facility]    Home Care Agency/Facility:     Your wound-care supplies will be provided by: [facility] [address] [place] [address]  p: [phone] f: [phone]    Please note, depending on your insurance coverage, you may have out-of-pocket expenses for these supplies. Someone from the company should call you to confirm your order and discuss those potential costs before they ship your products -- please anticipate that call. If your out-of-pocket cost could be substantial, Many companies have financial hardship programs for patients who qualify, so please ask about that if you might need a hand. If you have any questions about your supplies or your potential out-of-pocket costs, or if you need to place an order for a refill of supplies (typically monthly), please call the company directly.     Your  is Shaila    Follow up with Dr Gage In 2 week(s) in the wound care center.       Wound Care Center Information: Should you experience any significant changes in your wound(s) or have questions about your wound care, please contact the [facility] at [phone] Monday  - Thursday 8:00 am - 4:00 pm and Friday 8:00 am - 1:00pm. If you need help with your wound outside these hours and cannot wait until we are again available, contact your PCP or go to the hospital emergency room.     PLEASE NOTE: IF YOU ARE UNABLE TO OBTAIN WOUND SUPPLIES, CONTINUE TO USE THE SUPPLIES YOU HAVE AVAILABLE UNTIL YOU ARE ABLE TO REACH US. IT IS MOST IMPORTANT TO KEEP THE WOUND COVERED AT ALL TIMES.    Patient Experience    Thank you for trusting us with your care.  You may receive a survey from a company called [employer] asking for your feedback.  We would appreciate it if you took a few minutes to share your experience.  Your input is very valuable to us.

## 2024-06-12 ENCOUNTER — HOSPITAL ENCOUNTER (OUTPATIENT)
Dept: WOUND CARE | Age: 79
Discharge: HOME | End: 2024-06-12
Attending: PODIATRIST
Payer: MEDICARE

## 2024-06-12 ENCOUNTER — TELEPHONE (OUTPATIENT)
Dept: VASCULAR SURGERY | Age: 79
End: 2024-06-12

## 2024-06-12 VITALS
TEMPERATURE: 97.1 F | RESPIRATION RATE: 18 BRPM | SYSTOLIC BLOOD PRESSURE: 105 MMHG | DIASTOLIC BLOOD PRESSURE: 67 MMHG | HEART RATE: 69 BPM

## 2024-06-12 DIAGNOSIS — I73.9 PVD (PERIPHERAL VASCULAR DISEASE) (HCC): ICD-10-CM

## 2024-06-12 DIAGNOSIS — L97.313 NON-PRESSURE CHRONIC ULCER OF RIGHT ANKLE WITH NECROSIS OF MUSCLE (HCC): ICD-10-CM

## 2024-06-12 DIAGNOSIS — L97.324 NON-PRESSURE CHRONIC ULCER OF LEFT ANKLE WITH NECROSIS OF BONE (HCC): Primary | ICD-10-CM

## 2024-06-12 PROCEDURE — 11043 DBRDMT MUSC&/FSCA 1ST 20/<: CPT

## 2024-06-12 RX ORDER — TRIAMCINOLONE ACETONIDE 1 MG/G
OINTMENT TOPICAL ONCE
OUTPATIENT
Start: 2024-06-12 | End: 2024-06-12

## 2024-06-12 RX ORDER — LIDOCAINE HYDROCHLORIDE 20 MG/ML
JELLY TOPICAL ONCE
OUTPATIENT
Start: 2024-06-12 | End: 2024-06-12

## 2024-06-12 RX ORDER — BACITRACIN ZINC AND POLYMYXIN B SULFATE 500; 1000 [USP'U]/G; [USP'U]/G
OINTMENT TOPICAL ONCE
OUTPATIENT
Start: 2024-06-12 | End: 2024-06-12

## 2024-06-12 RX ORDER — BETAMETHASONE DIPROPIONATE 0.5 MG/G
CREAM TOPICAL ONCE
OUTPATIENT
Start: 2024-06-12 | End: 2024-06-12

## 2024-06-12 RX ORDER — LIDOCAINE 50 MG/G
OINTMENT TOPICAL ONCE
OUTPATIENT
Start: 2024-06-12 | End: 2024-06-12

## 2024-06-12 RX ORDER — GENTAMICIN SULFATE 1 MG/G
OINTMENT TOPICAL ONCE
OUTPATIENT
Start: 2024-06-12 | End: 2024-06-12

## 2024-06-12 RX ORDER — LIDOCAINE 40 MG/G
CREAM TOPICAL ONCE
OUTPATIENT
Start: 2024-06-12 | End: 2024-06-12

## 2024-06-12 RX ORDER — GINSENG 100 MG
CAPSULE ORAL ONCE
OUTPATIENT
Start: 2024-06-12 | End: 2024-06-12

## 2024-06-12 RX ORDER — CLOBETASOL PROPIONATE 0.5 MG/G
OINTMENT TOPICAL ONCE
OUTPATIENT
Start: 2024-06-12 | End: 2024-06-12

## 2024-06-12 RX ORDER — SODIUM CHLOR/HYPOCHLOROUS ACID 0.033 %
SOLUTION, IRRIGATION IRRIGATION ONCE
OUTPATIENT
Start: 2024-06-12 | End: 2024-06-12

## 2024-06-12 RX ORDER — LIDOCAINE 40 MG/G
CREAM TOPICAL ONCE
Status: DISCONTINUED | OUTPATIENT
Start: 2024-06-12 | End: 2024-06-13 | Stop reason: HOSPADM

## 2024-06-12 RX ORDER — IBUPROFEN 200 MG
TABLET ORAL ONCE
OUTPATIENT
Start: 2024-06-12 | End: 2024-06-12

## 2024-06-12 RX ORDER — LIDOCAINE HYDROCHLORIDE 40 MG/ML
SOLUTION TOPICAL ONCE
OUTPATIENT
Start: 2024-06-12 | End: 2024-06-12

## 2024-06-12 ASSESSMENT — PAIN SCALES - GENERAL: PAINLEVEL_OUTOF10: 3

## 2024-06-12 ASSESSMENT — PAIN DESCRIPTION - ORIENTATION: ORIENTATION: RIGHT

## 2024-06-12 ASSESSMENT — PAIN DESCRIPTION - DESCRIPTORS: DESCRIPTORS: ACHING

## 2024-06-12 ASSESSMENT — PAIN DESCRIPTION - LOCATION: LOCATION: LEG

## 2024-06-12 NOTE — PLAN OF CARE
Discharge instructions given.  Patient verbalized understanding.  Return to [facility] in 2 week(s).

## 2024-06-12 NOTE — PROGRESS NOTES
[facility] Wound Care Center  Progress Note and Procedure Note      Jessica Nails  AGE: 78 y.o.   GENDER: female  : 1945  TODAY'S DATE:  2024    Subjective:     No chief complaint on file.        HISTORY of PRESENT ILLNESS HPI     Jessica Nails is a 78 y.o. female who presents today for wound evaluation.  She is known to us from previous wound on her left leg and has had previous vascular intervention.    She feels the wound is looking improved.  She is changing the dressing with just the gentamicin cream because she does not like the Triad because it is hard to remove.  She denies current nausea, vomiting, fever, chills, shortness of breath or chest pain.  Wound Pain:  moderate  Severity:  5 / 10   Wound Type:  arterial and refractory osteomyelitis  Modifying Factors:  edema, venous stasis, lymphedema, arterial insufficiency, decreased tissue oxygenation and substance abuse  Associated Signs/Symptoms:  edema, drainage and pain        PAST MEDICAL HISTORY        Diagnosis Date    CAD (coronary artery disease)     Hyperlipidemia     Hypertension     PAD (peripheral artery disease) (HCC)        PAST SURGICAL HISTORY    Past Surgical History:   Procedure Laterality Date    IR ANGXPTA FEM POP W STENT         FAMILY HISTORY    Family History   Problem Relation Age of Onset    Coronary Art Dis Mother        SOCIAL HISTORY    Social History     Tobacco Use    Smoking status: Former     Current packs/day: 0.00     Types: Cigarettes     Quit date: 2022     Years since quittin.4    Smokeless tobacco: Never   Vaping Use    Vaping Use: Some days    Substances: Nicotine   Substance Use Topics    Alcohol use: Not Currently       ALLERGIES    No Known Allergies    MEDICATIONS    Current Outpatient Medications on File Prior to Encounter   Medication Sig Dispense Refill    gentamicin (GARAMYCIN) 0.1 % cream Apply topically daily. 30 g 1    clotrimazole-betamethasone (LOTRISONE) 1-0.05 % cream Apply

## 2024-06-12 NOTE — TELEPHONE ENCOUNTER
Patient called stating that Dr. Gage wanted her to see Dr. Romero again due to non healing wounds on right leg.  Patient had a scan in the office this morning.  Please advise if patient needs to come in sooner than 7/2.      [phone]

## 2024-06-14 ENCOUNTER — PREP FOR PROCEDURE (OUTPATIENT)
Dept: VASCULAR SURGERY | Age: 79
End: 2024-06-14

## 2024-06-14 RX ORDER — SODIUM CHLORIDE 0.9 % (FLUSH) 0.9 %
5-40 SYRINGE (ML) INJECTION EVERY 12 HOURS SCHEDULED
Status: CANCELLED | OUTPATIENT
Start: 2024-06-14

## 2024-06-14 RX ORDER — SODIUM CHLORIDE 9 MG/ML
INJECTION, SOLUTION INTRAVENOUS PRN
Status: CANCELLED | OUTPATIENT
Start: 2024-06-14

## 2024-06-14 RX ORDER — SODIUM CHLORIDE 0.9 % (FLUSH) 0.9 %
5-40 SYRINGE (ML) INJECTION PRN
Status: CANCELLED | OUTPATIENT
Start: 2024-06-14

## 2024-06-14 NOTE — TELEPHONE ENCOUNTER
Patient ok to move forward with angiogram. Discussed details: 7/1 T 9AM arrive 8am, NPO after midnight, will need transportaion

## 2024-06-24 NOTE — PATIENT INSTRUCTIONS
[facility]  [address]   [place], [place] [address]  Telephone: [phone]     FAX [phone]    Discharge Instructions    Important reminders:    **If you have any signs and symptoms of illness (Cough, fever, congestion, nausea, vomiting, diarrhea, etc.) please call the wound care center prior to your appointment.    1. Increase Protein intake for optimal wound healing  2. No added salt to reduce any swelling  3. If diabetic, maintain good glucose control  4. If you smoke, smoking prohibits wound healing, we ask that you refrain from smoking.  5. When taking antibiotics take the entire prescription as ordered. Do not stop taking until medication is all gone unless otherwise instructed.   6. Exercise as tolerated.   7. Keep weight off wounds and reposition every 2 hours if applicable.  8. If wound(s) is on your lower extremity, elevate legs to the level of the heart or above for 30 minutes 4-5 times a day and/or when sitting. Avoid standing for long periods of time.   9. Do not get wounds wet in bath or shower unless otherwise instructed by your physician. If your wound is on your foot or leg, you may purchase a cast bag. Please ask at the pharmacy.      If Vascular testing is ordered, please call [phone] to schedule.    Vascular tests ordered by Wound Care Physicians may take up to 2 hours to complete. Please keep that in mind when scheduling.     If Vascular testing is scheduled, please bring supplies to replace your dressing after testing is done. The vascular department does not stock supplies.     Wound:  Right leg    With each dressing change, rinse wounds with 0.9% Saline. (May use wound wash or soft contact solution. Both can be purchased at a local drug store). If unable to obtain saline, may use a gentle soap and water.    Dressing care: Mix gentamicin cream and triad, dry dressing, 2 tubigrip- change daily. Call Dr Romero for an appointment for abnormal arterial

## 2024-06-26 ENCOUNTER — HOSPITAL ENCOUNTER (OUTPATIENT)
Dept: WOUND CARE | Age: 79
Discharge: HOME | End: 2024-06-26
Attending: PODIATRIST
Payer: MEDICARE

## 2024-06-26 VITALS — SYSTOLIC BLOOD PRESSURE: 125 MMHG | RESPIRATION RATE: 18 BRPM | DIASTOLIC BLOOD PRESSURE: 74 MMHG | HEART RATE: 64 BPM

## 2024-06-26 DIAGNOSIS — L97.324 NON-PRESSURE CHRONIC ULCER OF LEFT ANKLE WITH NECROSIS OF BONE (HCC): Primary | ICD-10-CM

## 2024-06-26 PROCEDURE — 11043 DBRDMT MUSC&/FSCA 1ST 20/<: CPT

## 2024-06-26 RX ORDER — IBUPROFEN 200 MG
TABLET ORAL ONCE
OUTPATIENT
Start: 2024-06-26 | End: 2024-06-26

## 2024-06-26 RX ORDER — LIDOCAINE HYDROCHLORIDE 40 MG/ML
SOLUTION TOPICAL ONCE
OUTPATIENT
Start: 2024-06-26 | End: 2024-06-26

## 2024-06-26 RX ORDER — LIDOCAINE 40 MG/G
CREAM TOPICAL ONCE
Status: DISCONTINUED | OUTPATIENT
Start: 2024-06-26 | End: 2024-06-27 | Stop reason: HOSPADM

## 2024-06-26 RX ORDER — GINSENG 100 MG
CAPSULE ORAL ONCE
OUTPATIENT
Start: 2024-06-26 | End: 2024-06-26

## 2024-06-26 RX ORDER — LIDOCAINE HYDROCHLORIDE 20 MG/ML
JELLY TOPICAL ONCE
OUTPATIENT
Start: 2024-06-26 | End: 2024-06-26

## 2024-06-26 RX ORDER — BETAMETHASONE DIPROPIONATE 0.5 MG/G
CREAM TOPICAL ONCE
OUTPATIENT
Start: 2024-06-26 | End: 2024-06-26

## 2024-06-26 RX ORDER — TRIAMCINOLONE ACETONIDE 1 MG/G
OINTMENT TOPICAL ONCE
OUTPATIENT
Start: 2024-06-26 | End: 2024-06-26

## 2024-06-26 RX ORDER — GENTAMICIN SULFATE 1 MG/G
OINTMENT TOPICAL ONCE
OUTPATIENT
Start: 2024-06-26 | End: 2024-06-26

## 2024-06-26 RX ORDER — SODIUM CHLOR/HYPOCHLOROUS ACID 0.033 %
SOLUTION, IRRIGATION IRRIGATION ONCE
OUTPATIENT
Start: 2024-06-26 | End: 2024-06-26

## 2024-06-26 RX ORDER — BACITRACIN ZINC AND POLYMYXIN B SULFATE 500; 1000 [USP'U]/G; [USP'U]/G
OINTMENT TOPICAL ONCE
OUTPATIENT
Start: 2024-06-26 | End: 2024-06-26

## 2024-06-26 RX ORDER — CLOBETASOL PROPIONATE 0.5 MG/G
OINTMENT TOPICAL ONCE
OUTPATIENT
Start: 2024-06-26 | End: 2024-06-26

## 2024-06-26 RX ORDER — LIDOCAINE 40 MG/G
CREAM TOPICAL ONCE
OUTPATIENT
Start: 2024-06-26 | End: 2024-06-26

## 2024-06-26 RX ORDER — LIDOCAINE 50 MG/G
OINTMENT TOPICAL ONCE
OUTPATIENT
Start: 2024-06-26 | End: 2024-06-26

## 2024-06-26 ASSESSMENT — PAIN DESCRIPTION - LOCATION: LOCATION: LEG

## 2024-06-26 ASSESSMENT — PAIN DESCRIPTION - PAIN TYPE: TYPE: CHRONIC PAIN

## 2024-06-26 ASSESSMENT — PAIN SCALES - GENERAL: PAINLEVEL_OUTOF10: 5

## 2024-06-26 ASSESSMENT — PAIN DESCRIPTION - ONSET: ONSET: ON-GOING

## 2024-06-26 ASSESSMENT — PAIN DESCRIPTION - FREQUENCY: FREQUENCY: INTERMITTENT

## 2024-06-26 ASSESSMENT — PAIN DESCRIPTION - ORIENTATION: ORIENTATION: RIGHT

## 2024-06-26 ASSESSMENT — PAIN - FUNCTIONAL ASSESSMENT: PAIN_FUNCTIONAL_ASSESSMENT: ACTIVITIES ARE NOT PREVENTED

## 2024-06-26 NOTE — PLAN OF CARE
Discharge instructions given.  Patient verbalized understanding.  Return to [facility] in 1 week(s).  Angio Monday

## 2024-07-01 ENCOUNTER — HOSPITAL ENCOUNTER (OUTPATIENT)
Age: 79
Setting detail: OUTPATIENT SURGERY
Discharge: HOME | End: 2024-07-01
Attending: SURGERY | Admitting: SURGERY
Payer: MEDICARE

## 2024-07-01 VITALS
WEIGHT: 98 LBS | SYSTOLIC BLOOD PRESSURE: 131 MMHG | BODY MASS INDEX: 16.73 KG/M2 | HEIGHT: 64 IN | TEMPERATURE: 98.5 F | DIASTOLIC BLOOD PRESSURE: 65 MMHG | OXYGEN SATURATION: 99 % | HEART RATE: 63 BPM | RESPIRATION RATE: 19 BRPM

## 2024-07-01 DIAGNOSIS — I73.9 PERIPHERAL VASCULAR DISEASE, UNSPECIFIED (HCC): ICD-10-CM

## 2024-07-01 LAB
ANION GAP SERPL CALCULATED.3IONS-SCNC: 13 MMOL/L (ref 3–16)
BUN SERPL-MCNC: 13 MG/DL (ref 7–20)
CALCIUM SERPL-MCNC: 9.7 MG/DL (ref 8.3–10.6)
CHLORIDE SERPL-SCNC: 98 MMOL/L (ref 99–110)
CO2 SERPL-SCNC: 25 MMOL/L (ref 21–32)
CREAT SERPL-MCNC: 0.8 MG/DL (ref 0.6–1.2)
DEPRECATED RDW RBC AUTO: 13.4 % (ref 12.4–15.4)
ECHO BSA: 1.42 M2
GFR SERPLBLD CREATININE-BSD FMLA CKD-EPI: 74 ML/MIN/{1.73_M2}
GLUCOSE SERPL-MCNC: 100 MG/DL (ref 70–99)
HCT VFR BLD AUTO: 41 % (ref 36–48)
HGB BLD-MCNC: 13.6 G/DL (ref 12–16)
MCH RBC QN AUTO: 30.3 PG (ref 26–34)
MCHC RBC AUTO-ENTMCNC: 33.2 G/DL (ref 31–36)
MCV RBC AUTO: 91.3 FL (ref 80–100)
PLATELET # BLD AUTO: 138 K/UL (ref 135–450)
PMV BLD AUTO: 10.7 FL (ref 5–10.5)
POTASSIUM SERPL-SCNC: 3.3 MMOL/L (ref 3.5–5.1)
RBC # BLD AUTO: 4.49 M/UL (ref 4–5.2)
SODIUM SERPL-SCNC: 136 MMOL/L (ref 136–145)
WBC # BLD AUTO: 5.6 K/UL (ref 4–11)

## 2024-07-01 PROCEDURE — C1769 GUIDE WIRE: HCPCS | Performed by: SURGERY

## 2024-07-01 PROCEDURE — 7100000010 HC PHASE II RECOVERY - FIRST 15 MIN: Performed by: SURGERY

## 2024-07-01 PROCEDURE — C1887 CATHETER, GUIDING: HCPCS | Performed by: SURGERY

## 2024-07-01 PROCEDURE — 75716 ARTERY X-RAYS ARMS/LEGS: CPT | Performed by: SURGERY

## 2024-07-01 PROCEDURE — 85027 COMPLETE CBC AUTOMATED: CPT

## 2024-07-01 PROCEDURE — 37224 PR REVSC OPN/PRG FEM/POP W/ANGIOPLASTY UNI: CPT | Performed by: SURGERY

## 2024-07-01 PROCEDURE — 6360000004 HC RX CONTRAST MEDICATION: Performed by: SURGERY

## 2024-07-01 PROCEDURE — 6360000002 HC RX W HCPCS

## 2024-07-01 PROCEDURE — 37224 HC FEM POP TERRITORY PLASTY: CPT | Performed by: SURGERY

## 2024-07-01 PROCEDURE — 37224 HC FEM POP TERRITORY PLASTY: CPT

## 2024-07-01 PROCEDURE — 36415 COLL VENOUS BLD VENIPUNCTURE: CPT

## 2024-07-01 PROCEDURE — 2709999900 HC NON-CHARGEABLE SUPPLY: Performed by: SURGERY

## 2024-07-01 PROCEDURE — 75625 CONTRAST EXAM ABDOMINL AORTA: CPT | Performed by: SURGERY

## 2024-07-01 PROCEDURE — 80048 BASIC METABOLIC PNL TOTAL CA: CPT

## 2024-07-01 PROCEDURE — 99152 MOD SED SAME PHYS/QHP 5/>YRS: CPT | Performed by: SURGERY

## 2024-07-01 PROCEDURE — 75774 ARTERY X-RAY EACH VESSEL: CPT | Performed by: SURGERY

## 2024-07-01 PROCEDURE — C1894 INTRO/SHEATH, NON-LASER: HCPCS | Performed by: SURGERY

## 2024-07-01 PROCEDURE — 75716 ARTERY X-RAYS ARMS/LEGS: CPT

## 2024-07-01 PROCEDURE — C1725 CATH, TRANSLUMIN NON-LASER: HCPCS | Performed by: SURGERY

## 2024-07-01 PROCEDURE — 75625 CONTRAST EXAM ABDOMINL AORTA: CPT

## 2024-07-01 PROCEDURE — 75774 ARTERY X-RAY EACH VESSEL: CPT

## 2024-07-01 PROCEDURE — 6360000002 HC RX W HCPCS: Performed by: SURGERY

## 2024-07-01 PROCEDURE — 7100000011 HC PHASE II RECOVERY - ADDTL 15 MIN: Performed by: SURGERY

## 2024-07-01 PROCEDURE — C1760 CLOSURE DEV, VASC: HCPCS | Performed by: SURGERY

## 2024-07-01 PROCEDURE — 2500000003 HC RX 250 WO HCPCS

## 2024-07-01 PROCEDURE — 2500000003 HC RX 250 WO HCPCS: Performed by: SURGERY

## 2024-07-01 PROCEDURE — 99153 MOD SED SAME PHYS/QHP EA: CPT | Performed by: SURGERY

## 2024-07-01 RX ORDER — FENTANYL CITRATE 50 UG/ML
INJECTION, SOLUTION INTRAMUSCULAR; INTRAVENOUS PRN
Status: DISCONTINUED | OUTPATIENT
Start: 2024-07-01 | End: 2024-07-01 | Stop reason: HOSPADM

## 2024-07-01 RX ORDER — SODIUM CHLORIDE 0.9 % (FLUSH) 0.9 %
5-40 SYRINGE (ML) INJECTION EVERY 12 HOURS SCHEDULED
Status: DISCONTINUED | OUTPATIENT
Start: 2024-07-01 | End: 2024-07-01 | Stop reason: HOSPADM

## 2024-07-01 RX ORDER — MIDAZOLAM HYDROCHLORIDE 1 MG/ML
INJECTION INTRAMUSCULAR; INTRAVENOUS PRN
Status: DISCONTINUED | OUTPATIENT
Start: 2024-07-01 | End: 2024-07-01 | Stop reason: HOSPADM

## 2024-07-01 RX ORDER — SODIUM CHLORIDE 9 MG/ML
INJECTION, SOLUTION INTRAVENOUS PRN
Status: DISCONTINUED | OUTPATIENT
Start: 2024-07-01 | End: 2024-07-01 | Stop reason: HOSPADM

## 2024-07-01 RX ORDER — HEPARIN SODIUM 1000 [USP'U]/ML
INJECTION, SOLUTION INTRAVENOUS; SUBCUTANEOUS PRN
Status: DISCONTINUED | OUTPATIENT
Start: 2024-07-01 | End: 2024-07-01 | Stop reason: HOSPADM

## 2024-07-01 RX ORDER — SODIUM CHLORIDE 0.9 % (FLUSH) 0.9 %
5-40 SYRINGE (ML) INJECTION PRN
Status: DISCONTINUED | OUTPATIENT
Start: 2024-07-01 | End: 2024-07-01 | Stop reason: HOSPADM

## 2024-07-01 RX ORDER — PROTAMINE SULFATE 10 MG/ML
INJECTION, SOLUTION INTRAVENOUS PRN
Status: DISCONTINUED | OUTPATIENT
Start: 2024-07-01 | End: 2024-07-01 | Stop reason: HOSPADM

## 2024-07-01 NOTE — H&P
Consultation/History & Physical    Date of Admission:  2024  Date of Consultation:  2024    PCP:  Lavon Morrow DO     Chief Complaint: RLE ulcer    History of Present Illness:  Jessica Nails is a 78 y.o. female who presents with RLE ulcer.  Presents for peripheral angiogram.     PMH:   has a past medical history of CAD (coronary artery disease), Hyperlipidemia, Hypertension, and PAD (peripheral artery disease) ([facility]).      PSH:   has a past surgical history that includes IR ANGIOPLASTY FEM POP W STENT.    Allergies:  No Known Allergies     Home Meds:    Prior to Admission medications    Medication Sig Start Date End Date Taking? Authorizing Provider   gentamicin (GARAMYCIN) 0.1 % cream Apply topically daily. 24   Jonathan Gage DPM   clotrimazole-betamethasone (LOTRISONE) 1-0.05 % cream Apply topically 2 times daily. 6/15/22   Jonathan Gage DPM   ibuprofen (ADVIL;MOTRIN) 800 MG tablet TAKE ONE TABLET BY MOUTH THREE TIMES DAILY 21   Jesús Francis MD   lisinopril-hydroCHLOROthiazide (PRINZIDE;ZESTORETIC) 20-25 MG per tablet TAKE ONE TABLET BY MOUTH EVERY DAY 3/5/21   Jesús Francis MD   clopidogrel (PLAVIX) 75 MG tablet Take 1 tablet by mouth daily 20   Jesús Francis MD   atorvastatin (LIPITOR) 10 MG tablet TAKE ONE TABLET BY MOUTH EVERY DAY 21   Jesús Francis MD   aspirin 81 MG EC tablet Take 1 tablet by mouth daily    Jesús Francis MD   Potassium Chloride Jessie ER (KLOR-CON M20 PO) Take by mouth daily    Jesús Francis MD        Hospital Meds:    No current facility-administered medications for this encounter.       Social History:       Social History     Socioeconomic History    Marital status:    Tobacco Use    Smoking status: Former     Current packs/day: 0.00     Types: Cigarettes     Quit date: 2022     Years since quittin.4    Smokeless tobacco: Never   Vaping Use    Vaping Use: Some days

## 2024-07-01 NOTE — DISCHARGE INSTRUCTIONS
PERIPHERAL ANGIOGRAM    Care of your puncture site:  Remove bandage 24 hours after the procedure.  May shower in 24 hours but do not sit in a bathtub/pool of water for 5 days or until the wound is healed.  Inspect the site daily and gently clean using soap and water while standing in the shower.  Dry thoroughly and apply a Band-Aid that covers the entire site. Do not apply powder or lotion.    Normal Observations:  Soreness or tenderness which may last one week.  Mild oozing from the incision site.  Possible bruising that could last 2 weeks.    Activity:  You may resume driving 24 hours following the procedure.  You may resume normal activity in 5 days or after the wound heals.  Avoid lifting more than 10 pounds for 5 days or until the wound heals.  Avoid strenuous exercise or activity for 1 week.    Nutrition:  Regular diet   Drink at least 8 to 10 glasses of decaffeinated, non-alcoholic fluid for the next 24 hours to flush the x-ray dye used for your angiogram out of your body.    Call your doctor immediately if your condition worsens, for any other concerns, for a follow-up appointment or if you experience any of the following:  Significant bleeding that does not stop after 10 minutes of applying firm pressure on the puncture site.  Increased swelling on the groin or leg.  Unusual pain, numbness, or tingling of the groin or down the leg.  Any signs of infection such as: redness, yellow drainage at the site, swelling or pain.

## 2024-07-03 NOTE — PATIENT INSTRUCTIONS
[facility]  [address]   [place], [place] [address]  Telephone: [phone]     FAX [phone]    Discharge Instructions    Important reminders:    **If you have any signs and symptoms of illness (Cough, fever, congestion, nausea, vomiting, diarrhea, etc.) please call the wound care center prior to your appointment.    1. Increase Protein intake for optimal wound healing  2. No added salt to reduce any swelling  3. If diabetic, maintain good glucose control  4. If you smoke, smoking prohibits wound healing, we ask that you refrain from smoking.  5. When taking antibiotics take the entire prescription as ordered. Do not stop taking until medication is all gone unless otherwise instructed.   6. Exercise as tolerated.   7. Keep weight off wounds and reposition every 2 hours if applicable.  8. If wound(s) is on your lower extremity, elevate legs to the level of the heart or above for 30 minutes 4-5 times a day and/or when sitting. Avoid standing for long periods of time.   9. Do not get wounds wet in bath or shower unless otherwise instructed by your physician. If your wound is on your foot or leg, you may purchase a cast bag. Please ask at the pharmacy.      If Vascular testing is ordered, please call [phone] to schedule.    Vascular tests ordered by Wound Care Physicians may take up to 2 hours to complete. Please keep that in mind when scheduling.     If Vascular testing is scheduled, please bring supplies to replace your dressing after testing is done. The vascular department does not stock supplies.     Wound:  Right leg    With each dressing change, rinse wounds with 0.9% Saline. (May use wound wash or soft contact solution. Both can be purchased at a local drug store). If unable to obtain saline, may use a gentle soap and water.    Dressing care: Haley on outer side. Gentamicin cream and Hydroferra blue to inside wound. Change every 2 days.  2 tubigrip-       Home Care

## 2024-07-05 ENCOUNTER — HOSPITAL ENCOUNTER (OUTPATIENT)
Dept: WOUND CARE | Age: 79
Discharge: HOME | End: 2024-07-05
Attending: PODIATRIST
Payer: MEDICARE

## 2024-07-05 DIAGNOSIS — L97.324 NON-PRESSURE CHRONIC ULCER OF LEFT ANKLE WITH NECROSIS OF BONE (HCC): Primary | ICD-10-CM

## 2024-07-05 PROCEDURE — 11042 DBRDMT SUBQ TIS 1ST 20SQCM/<: CPT

## 2024-07-05 RX ORDER — SODIUM CHLOR/HYPOCHLOROUS ACID 0.033 %
SOLUTION, IRRIGATION IRRIGATION ONCE
OUTPATIENT
Start: 2024-07-05 | End: 2024-07-05

## 2024-07-05 RX ORDER — LIDOCAINE 40 MG/G
CREAM TOPICAL ONCE
Status: DISCONTINUED | OUTPATIENT
Start: 2024-07-05 | End: 2024-07-06 | Stop reason: HOSPADM

## 2024-07-05 RX ORDER — BACITRACIN ZINC AND POLYMYXIN B SULFATE 500; 1000 [USP'U]/G; [USP'U]/G
OINTMENT TOPICAL ONCE
OUTPATIENT
Start: 2024-07-05 | End: 2024-07-05

## 2024-07-05 RX ORDER — BETAMETHASONE DIPROPIONATE 0.5 MG/G
CREAM TOPICAL ONCE
OUTPATIENT
Start: 2024-07-05 | End: 2024-07-05

## 2024-07-05 RX ORDER — LIDOCAINE HYDROCHLORIDE 40 MG/ML
SOLUTION TOPICAL ONCE
OUTPATIENT
Start: 2024-07-05 | End: 2024-07-05

## 2024-07-05 RX ORDER — IBUPROFEN 200 MG
TABLET ORAL ONCE
OUTPATIENT
Start: 2024-07-05 | End: 2024-07-05

## 2024-07-05 RX ORDER — TRIAMCINOLONE ACETONIDE 1 MG/G
OINTMENT TOPICAL ONCE
OUTPATIENT
Start: 2024-07-05 | End: 2024-07-05

## 2024-07-05 RX ORDER — LIDOCAINE 40 MG/G
CREAM TOPICAL ONCE
OUTPATIENT
Start: 2024-07-05 | End: 2024-07-05

## 2024-07-05 RX ORDER — GINSENG 100 MG
CAPSULE ORAL ONCE
OUTPATIENT
Start: 2024-07-05 | End: 2024-07-05

## 2024-07-05 RX ORDER — CLOBETASOL PROPIONATE 0.5 MG/G
OINTMENT TOPICAL ONCE
OUTPATIENT
Start: 2024-07-05 | End: 2024-07-05

## 2024-07-05 RX ORDER — GENTAMICIN SULFATE 1 MG/G
OINTMENT TOPICAL ONCE
OUTPATIENT
Start: 2024-07-05 | End: 2024-07-05

## 2024-07-05 RX ORDER — LIDOCAINE HYDROCHLORIDE 20 MG/ML
JELLY TOPICAL ONCE
OUTPATIENT
Start: 2024-07-05 | End: 2024-07-05

## 2024-07-05 RX ORDER — LIDOCAINE 50 MG/G
OINTMENT TOPICAL ONCE
OUTPATIENT
Start: 2024-07-05 | End: 2024-07-05

## 2024-07-05 NOTE — PROGRESS NOTES
[facility]  Progress Note and Procedure Note      Jessica Nails  AGE: 78 y.o.   GENDER: female  : 1945  TODAY'S DATE:  2024    Subjective:     Chief Complaint   Patient presents with    Wound Check     Right lower leg         HISTORY of PRESENT ILLNESS HPI     Jessica Nails is a 78 y.o. female who presents today for wound evaluation.  She is known to us from previous wound on her left leg and has had previous vascular intervention.    She is trying to change the bandage as directed.  She completed her angiogram earlier this week.  She denies current nausea, vomiting, fever, chills, shortness of breath or chest pain.  Wound Pain:  moderate  Severity:  5 / 10   Wound Type:  arterial and refractory osteomyelitis  Modifying Factors:  edema, venous stasis, lymphedema, arterial insufficiency, decreased tissue oxygenation and substance abuse  Associated Signs/Symptoms:  edema, drainage and pain        PAST MEDICAL HISTORY        Diagnosis Date    CAD (coronary artery disease)     Hyperlipidemia     Hypertension     PAD (peripheral artery disease) (HCC)        PAST SURGICAL HISTORY    Past Surgical History:   Procedure Laterality Date    INVASIVE VASCULAR N/A 2024    Angiography lower ext right performed by Guicho Romero II, MD at [facility] CARDIAC CATH LAB    INVASIVE VASCULAR N/A 2024    Angioplasty peripheral artery performed by Guicho Romero II, MD at [facility] CARDIAC CATH LAB    IR ANGXPTA FEM POP W STENT         FAMILY HISTORY    Family History   Problem Relation Age of Onset    Coronary Art Dis Mother        SOCIAL HISTORY    Social History     Tobacco Use    Smoking status: Former     Current packs/day: 0.00     Types: Cigarettes     Quit date: 2022     Years since quittin.4    Smokeless tobacco: Never   Vaping Use    Vaping Use: Some days    Substances: Nicotine   Substance Use Topics    Alcohol use: Not Currently       ALLERGIES    No Known

## 2024-07-05 NOTE — PLAN OF CARE
Discharge instructions given.  Patient verbalized understanding.  Return to [facility] in 1 week(s).

## 2024-07-11 NOTE — PATIENT INSTRUCTIONS
-- please anticipate that call. If your out-of-pocket cost could be substantial, Many companies have financial hardship programs for patients who qualify, so please ask about that if you might need a hand. If you have any questions about your supplies or your potential out-of-pocket costs, or if you need to place an order for a refill of supplies (typically monthly), please call the company directly.     Your  is Shaila    Follow up with Dr Gage In 1 week(s) in the wound care center.       Wound Care Center Information: Should you experience any significant changes in your wound(s) or have questions about your wound care, please contact the [facility] at [phone] Monday  - Thursday 8:00 am - 4:00 pm and Friday 8:00 am - 1:00pm. If you need help with your wound outside these hours and cannot wait until we are again available, contact your PCP or go to the hospital emergency room.     PLEASE NOTE: IF YOU ARE UNABLE TO OBTAIN WOUND SUPPLIES, CONTINUE TO USE THE SUPPLIES YOU HAVE AVAILABLE UNTIL YOU ARE ABLE TO REACH US. IT IS MOST IMPORTANT TO KEEP THE WOUND COVERED AT ALL TIMES.    Patient Experience    Thank you for trusting us with your care.  You may receive a survey from a company called [employer] asking for your feedback.  We would appreciate it if you took a few minutes to share your experience.  Your input is very valuable to us.

## 2024-07-12 ENCOUNTER — HOSPITAL ENCOUNTER (OUTPATIENT)
Dept: WOUND CARE | Age: 79
Discharge: HOME | End: 2024-07-12
Attending: PODIATRIST
Payer: MEDICARE

## 2024-07-12 DIAGNOSIS — L97.324 NON-PRESSURE CHRONIC ULCER OF LEFT ANKLE WITH NECROSIS OF BONE (HCC): Primary | ICD-10-CM

## 2024-07-12 PROCEDURE — 29581 APPL MULTLAYER CMPRN SYS LEG: CPT

## 2024-07-12 PROCEDURE — 11042 DBRDMT SUBQ TIS 1ST 20SQCM/<: CPT

## 2024-07-12 RX ORDER — LIDOCAINE HYDROCHLORIDE 20 MG/ML
JELLY TOPICAL ONCE
OUTPATIENT
Start: 2024-07-12 | End: 2024-07-12

## 2024-07-12 RX ORDER — TRIAMCINOLONE ACETONIDE 1 MG/G
OINTMENT TOPICAL ONCE
OUTPATIENT
Start: 2024-07-12 | End: 2024-07-12

## 2024-07-12 RX ORDER — BETAMETHASONE DIPROPIONATE 0.5 MG/G
CREAM TOPICAL ONCE
OUTPATIENT
Start: 2024-07-12 | End: 2024-07-12

## 2024-07-12 RX ORDER — IBUPROFEN 200 MG
TABLET ORAL ONCE
OUTPATIENT
Start: 2024-07-12 | End: 2024-07-12

## 2024-07-12 RX ORDER — LIDOCAINE 40 MG/G
CREAM TOPICAL ONCE
OUTPATIENT
Start: 2024-07-12 | End: 2024-07-12

## 2024-07-12 RX ORDER — BACITRACIN ZINC AND POLYMYXIN B SULFATE 500; 1000 [USP'U]/G; [USP'U]/G
OINTMENT TOPICAL ONCE
OUTPATIENT
Start: 2024-07-12 | End: 2024-07-12

## 2024-07-12 RX ORDER — GENTAMICIN SULFATE 1 MG/G
OINTMENT TOPICAL ONCE
OUTPATIENT
Start: 2024-07-12 | End: 2024-07-12

## 2024-07-12 RX ORDER — GINSENG 100 MG
CAPSULE ORAL ONCE
OUTPATIENT
Start: 2024-07-12 | End: 2024-07-12

## 2024-07-12 RX ORDER — LIDOCAINE HYDROCHLORIDE 40 MG/ML
SOLUTION TOPICAL ONCE
OUTPATIENT
Start: 2024-07-12 | End: 2024-07-12

## 2024-07-12 RX ORDER — SODIUM CHLOR/HYPOCHLOROUS ACID 0.033 %
SOLUTION, IRRIGATION IRRIGATION ONCE
OUTPATIENT
Start: 2024-07-12 | End: 2024-07-12

## 2024-07-12 RX ORDER — LIDOCAINE 40 MG/G
CREAM TOPICAL ONCE
Status: DISCONTINUED | OUTPATIENT
Start: 2024-07-12 | End: 2024-07-13 | Stop reason: HOSPADM

## 2024-07-12 RX ORDER — CLOBETASOL PROPIONATE 0.5 MG/G
OINTMENT TOPICAL ONCE
OUTPATIENT
Start: 2024-07-12 | End: 2024-07-12

## 2024-07-12 RX ORDER — LIDOCAINE 50 MG/G
OINTMENT TOPICAL ONCE
OUTPATIENT
Start: 2024-07-12 | End: 2024-07-12

## 2024-07-12 ASSESSMENT — PAIN SCALES - GENERAL: PAINLEVEL_OUTOF10: 3

## 2024-07-12 ASSESSMENT — PAIN DESCRIPTION - LOCATION: LOCATION: LEG

## 2024-07-12 ASSESSMENT — PAIN DESCRIPTION - ORIENTATION: ORIENTATION: RIGHT

## 2024-07-12 NOTE — PROGRESS NOTES
Multilayer Compression Wrap   Below the Knee    NAME:  Jessica Nails  YOB: 1945  MEDICAL RECORD NUMBER:  [id number]  DATE:  7/12/2024    Multilayer compression wrap: Applied primary and secondary dressing as ordered.  Applied multilayered dressing below the knee to right lower leg.  Instructed patient/caregiver not to remove dressing and to keep it clean and dry.   Instructed patient/caregiver on complications to report to provider, such as pain, numbness in toes, heavy drainage, and slippage of dressing.  Instructed patient on purpose of compression dressing and on activity and exercise recommendations.     Applied  2 layer wrap from toes to knee overlapping each time    Electronically signed by CHRISTIAN ARMIJO RN on 7/12/2024 at 11:58 AM

## 2024-07-12 NOTE — PROGRESS NOTES
[facility]  Progress Note and Procedure Note      Jessica Nails  AGE: 78 y.o.   GENDER: female  : 1945  TODAY'S DATE:  2024    Subjective:     Chief Complaint   Patient presents with    Wound Check     Right lower leg         HISTORY of PRESENT ILLNESS HPI     Jessica Nails is a 78 y.o. female who presents today for wound evaluation.  She is known to us from previous wound on her left leg and has had previous vascular intervention.    She has been changing the bandage as directed.  She has no other complaints at this time.  She states the wounds feel better.  She denies current nausea, vomiting, fever, chills, shortness of breath or chest pain.  Wound Pain:  moderate  Severity:  5 / 10   Wound Type:  arterial and refractory osteomyelitis  Modifying Factors:  edema, venous stasis, lymphedema, arterial insufficiency, decreased tissue oxygenation and substance abuse  Associated Signs/Symptoms:  edema, drainage and pain        PAST MEDICAL HISTORY        Diagnosis Date    CAD (coronary artery disease)     Hyperlipidemia     Hypertension     PAD (peripheral artery disease) (HCC)        PAST SURGICAL HISTORY    Past Surgical History:   Procedure Laterality Date    INVASIVE VASCULAR N/A 2024    Angiography lower ext right performed by Guicho Romero II, MD at [facility] CARDIAC CATH LAB    INVASIVE VASCULAR N/A 2024    Angioplasty peripheral artery performed by Guicho Romero II, MD at [facility] CARDIAC CATH LAB    IR ANGXPTA FEM POP W STENT         FAMILY HISTORY    Family History   Problem Relation Age of Onset    Coronary Art Dis Mother        SOCIAL HISTORY    Social History     Tobacco Use    Smoking status: Former     Current packs/day: 0.00     Types: Cigarettes     Quit date: 2022     Years since quittin.4    Smokeless tobacco: Never   Vaping Use    Vaping Use: Some days    Substances: Nicotine   Substance Use Topics    Alcohol use: Not Currently

## 2024-07-17 NOTE — PATIENT INSTRUCTIONS
any complications with the wraps( if they become wet, start to slide down, if the wraps become too tight) please call the office so that we can give you further direction      Compression Wraps  Location: Right leg  Type: Coflex calamine    Your doctor has ordered compression therapy for your wound. Compression bandages reduce the swelling, or edema, in your legs and prevent it from returning. The wound care staff will apply your compression wrap. It must be removed and re-applied at least weekly. As the swelling decreases, the boot no longer provides adequate compression and you need a new one. Once applied, you need to know how to take care of your compression wrap.     The boot must stay dry. Do not get it wet in the shower or tub. You may do a partial bath, or you can cover the boot with a large plastic bag, secured at the top, so that no water can get in.    Avoid standing in one place for long periods of time. If you must  one place, shift your weight and change positions often.    If you have CHF, consult your doctor before following the next two recommendations for leg elevation.     When sitting, elevate your legs on pillows, or put blocks under the foot of your bed. Your legs should be higher than your heart.    If your boot becomes painful, or you notice an increase in swelling in your toes, numbness or tingling, or purple color to your toes, remove the wrap and call the Wound Care Center. If it is after hours, call your doctor for instructions or go to the nearest emergency room.        Home Care Agency/Facility:     Your wound-care supplies will be provided by: [facility] [address] [place] [id number]  p: [phone] f: [phone]    Please note, depending on your insurance coverage, you may have out-of-pocket expenses for these supplies. Someone from the company should call you to confirm your order and discuss those potential costs before they

## 2024-07-19 ENCOUNTER — HOSPITAL ENCOUNTER (OUTPATIENT)
Dept: WOUND CARE | Age: 79
Discharge: HOME | End: 2024-07-19
Attending: PODIATRIST
Payer: MEDICARE

## 2024-07-19 VITALS — HEART RATE: 66 BPM | DIASTOLIC BLOOD PRESSURE: 67 MMHG | SYSTOLIC BLOOD PRESSURE: 120 MMHG | TEMPERATURE: 96.6 F

## 2024-07-19 DIAGNOSIS — L97.324 NON-PRESSURE CHRONIC ULCER OF LEFT ANKLE WITH NECROSIS OF BONE (HCC): Primary | ICD-10-CM

## 2024-07-19 PROCEDURE — 29581 APPL MULTLAYER CMPRN SYS LEG: CPT

## 2024-07-19 PROCEDURE — 11042 DBRDMT SUBQ TIS 1ST 20SQCM/<: CPT

## 2024-07-19 RX ORDER — LIDOCAINE HYDROCHLORIDE 40 MG/ML
SOLUTION TOPICAL ONCE
OUTPATIENT
Start: 2024-07-19 | End: 2024-07-19

## 2024-07-19 RX ORDER — CLOBETASOL PROPIONATE 0.5 MG/G
OINTMENT TOPICAL ONCE
OUTPATIENT
Start: 2024-07-19 | End: 2024-07-19

## 2024-07-19 RX ORDER — LIDOCAINE HYDROCHLORIDE 20 MG/ML
JELLY TOPICAL ONCE
OUTPATIENT
Start: 2024-07-19 | End: 2024-07-19

## 2024-07-19 RX ORDER — GENTAMICIN SULFATE 1 MG/G
OINTMENT TOPICAL ONCE
OUTPATIENT
Start: 2024-07-19 | End: 2024-07-19

## 2024-07-19 RX ORDER — LIDOCAINE 40 MG/G
CREAM TOPICAL ONCE
OUTPATIENT
Start: 2024-07-19 | End: 2024-07-19

## 2024-07-19 RX ORDER — SODIUM CHLOR/HYPOCHLOROUS ACID 0.033 %
SOLUTION, IRRIGATION IRRIGATION ONCE
OUTPATIENT
Start: 2024-07-19 | End: 2024-07-19

## 2024-07-19 RX ORDER — BETAMETHASONE DIPROPIONATE 0.5 MG/G
CREAM TOPICAL ONCE
OUTPATIENT
Start: 2024-07-19 | End: 2024-07-19

## 2024-07-19 RX ORDER — GINSENG 100 MG
CAPSULE ORAL ONCE
OUTPATIENT
Start: 2024-07-19 | End: 2024-07-19

## 2024-07-19 RX ORDER — LIDOCAINE 40 MG/G
CREAM TOPICAL ONCE
Status: DISCONTINUED | OUTPATIENT
Start: 2024-07-19 | End: 2024-07-20 | Stop reason: HOSPADM

## 2024-07-19 RX ORDER — TRIAMCINOLONE ACETONIDE 1 MG/G
OINTMENT TOPICAL ONCE
OUTPATIENT
Start: 2024-07-19 | End: 2024-07-19

## 2024-07-19 RX ORDER — IBUPROFEN 200 MG
TABLET ORAL ONCE
OUTPATIENT
Start: 2024-07-19 | End: 2024-07-19

## 2024-07-19 RX ORDER — BACITRACIN ZINC AND POLYMYXIN B SULFATE 500; 1000 [USP'U]/G; [USP'U]/G
OINTMENT TOPICAL ONCE
OUTPATIENT
Start: 2024-07-19 | End: 2024-07-19

## 2024-07-19 RX ORDER — LIDOCAINE 50 MG/G
OINTMENT TOPICAL ONCE
OUTPATIENT
Start: 2024-07-19 | End: 2024-07-19

## 2024-07-19 NOTE — PLAN OF CARE
Discharge instructions given.  Patient verbalized understanding.  Return to [facility] in 1 week(s).  Nurse visit next week

## 2024-07-19 NOTE — PROGRESS NOTES
Multilayer Compression Wrap   Below the Knee    NAME:  Jessica Nails  YOB: 1945  MEDICAL RECORD NUMBER:  [id number]  DATE:  7/19/2024    Multilayer compression wrap: Applied primary and secondary dressing as ordered.  Applied multilayered dressing below the knee to right lower leg.  Instructed patient/caregiver not to remove dressing and to keep it clean and dry.   Instructed patient/caregiver on complications to report to provider, such as pain, numbness in toes, heavy drainage, and slippage of dressing.  Instructed patient on purpose of compression dressing and on activity and exercise recommendations.     Applied  2 layer wrap from toes to knee overlapping each time    Electronically signed by CHRISTIAN ARMIJO RN on 7/19/2024 at 12:47 PM

## 2024-07-26 ENCOUNTER — HOSPITAL ENCOUNTER (OUTPATIENT)
Dept: WOUND CARE | Age: 79
Discharge: HOME | End: 2024-07-26
Attending: PODIATRIST
Payer: MEDICARE

## 2024-07-26 DIAGNOSIS — L97.324 NON-PRESSURE CHRONIC ULCER OF LEFT ANKLE WITH NECROSIS OF BONE (HCC): Primary | ICD-10-CM

## 2024-07-26 PROCEDURE — 29581 APPL MULTLAYER CMPRN SYS LEG: CPT

## 2024-07-26 RX ORDER — LIDOCAINE 40 MG/G
CREAM TOPICAL ONCE
OUTPATIENT
Start: 2024-07-26 | End: 2024-07-26

## 2024-07-26 RX ORDER — BACITRACIN ZINC AND POLYMYXIN B SULFATE 500; 1000 [USP'U]/G; [USP'U]/G
OINTMENT TOPICAL ONCE
OUTPATIENT
Start: 2024-07-26 | End: 2024-07-26

## 2024-07-26 RX ORDER — LIDOCAINE HYDROCHLORIDE 20 MG/ML
JELLY TOPICAL ONCE
OUTPATIENT
Start: 2024-07-26 | End: 2024-07-26

## 2024-07-26 RX ORDER — BETAMETHASONE DIPROPIONATE 0.5 MG/G
CREAM TOPICAL ONCE
OUTPATIENT
Start: 2024-07-26 | End: 2024-07-26

## 2024-07-26 RX ORDER — LIDOCAINE HYDROCHLORIDE 40 MG/ML
SOLUTION TOPICAL ONCE
OUTPATIENT
Start: 2024-07-26 | End: 2024-07-26

## 2024-07-26 RX ORDER — LIDOCAINE 50 MG/G
OINTMENT TOPICAL ONCE
OUTPATIENT
Start: 2024-07-26 | End: 2024-07-26

## 2024-07-26 RX ORDER — GINSENG 100 MG
CAPSULE ORAL ONCE
OUTPATIENT
Start: 2024-07-26 | End: 2024-07-26

## 2024-07-26 RX ORDER — TRIAMCINOLONE ACETONIDE 1 MG/G
OINTMENT TOPICAL ONCE
OUTPATIENT
Start: 2024-07-26 | End: 2024-07-26

## 2024-07-26 RX ORDER — IBUPROFEN 200 MG
TABLET ORAL ONCE
OUTPATIENT
Start: 2024-07-26 | End: 2024-07-26

## 2024-07-26 RX ORDER — GENTAMICIN SULFATE 1 MG/G
OINTMENT TOPICAL ONCE
OUTPATIENT
Start: 2024-07-26 | End: 2024-07-26

## 2024-07-26 RX ORDER — SODIUM CHLOR/HYPOCHLOROUS ACID 0.033 %
SOLUTION, IRRIGATION IRRIGATION ONCE
OUTPATIENT
Start: 2024-07-26 | End: 2024-07-26

## 2024-07-26 RX ORDER — CLOBETASOL PROPIONATE 0.5 MG/G
OINTMENT TOPICAL ONCE
OUTPATIENT
Start: 2024-07-26 | End: 2024-07-26

## 2024-07-26 NOTE — PATIENT INSTRUCTIONS
[facility]  [address]   [place], [place] [address]  Telephone: [phone]     FAX [phone]    Discharge Instructions    Important reminders:    **If you have any signs and symptoms of illness (Cough, fever, congestion, nausea, vomiting, diarrhea, etc.) please call the wound care center prior to your appointment.    1. Increase Protein intake for optimal wound healing  2. No added salt to reduce any swelling  3. If diabetic, maintain good glucose control  4. If you smoke, smoking prohibits wound healing, we ask that you refrain from smoking.  5. When taking antibiotics take the entire prescription as ordered. Do not stop taking until medication is all gone unless otherwise instructed.   6. Exercise as tolerated.   7. Keep weight off wounds and reposition every 2 hours if applicable.  8. If wound(s) is on your lower extremity, elevate legs to the level of the heart or above for 30 minutes 4-5 times a day and/or when sitting. Avoid standing for long periods of time.   9. Do not get wounds wet in bath or shower unless otherwise instructed by your physician. If your wound is on your foot or leg, you may purchase a cast bag. Please ask at the pharmacy.      If Vascular testing is ordered, please call [phone] to schedule.    Vascular tests ordered by Wound Care Physicians may take up to 2 hours to complete. Please keep that in mind when scheduling.     If Vascular testing is scheduled, please bring supplies to replace your dressing after testing is done. The vascular department does not stock supplies.     Wound:  Right leg    With each dressing change, rinse wounds with 0.9% Saline. (May use wound wash or soft contact solution. Both can be purchased at a local drug store). If unable to obtain saline, may use a gentle soap and water.    Dressing care: Most medial wound - Gentamicin cream, epiona, Optilock  Coflex calamine-  These wraps will be changed at your next visit. If you have

## 2024-07-26 NOTE — PLAN OF CARE
Discharge instructions given.  Patient verbalized understanding.  Return to [facility] in 1 week(s).  Continue with Coflex Calamine

## 2024-07-26 NOTE — PROGRESS NOTES
Multilayer Compression Wrap   Below the Knee    NAME:  Jessica Nails  YOB: 1945  MEDICAL RECORD NUMBER:  [id number]  DATE:  7/26/2024    Multilayer compression wrap: Removed old Multilayer wrap if indicated and wash leg with mild soap/water.  Applied moisturizing agent to dry skin as needed.   Applied primary and secondary dressing as ordered.  Applied multilayered dressing below the knee to right lower leg.  Instructed patient/caregiver not to remove dressing and to keep it clean and dry.   Instructed patient/caregiver on complications to report to provider, such as pain, numbness in toes, heavy drainage, and slippage of dressing.  Instructed patient on purpose of compression dressing and on activity and exercise recommendations.     Applied  2 layer wrap from toes to knee overlapping each time  Applied Gentamicin and dry dressing  Electronically signed by HARYR DUONG RN on 7/26/2024 at 10:51 AM

## 2024-08-01 NOTE — PATIENT INSTRUCTIONS
[facility]  [address]   [place], [place] [address]  Telephone: [phone]     FAX [phone]    Discharge Instructions    Congratulations! You have completed your treatment program!    To prevent Venous Wounds from recurring again:  Elevate your legs at least parallel to the ground while sitting.  Wear your prescription support stockings everyday and remove at night while you sleep.  Replace your stockings every 4-6 months so that your legs continue to get the support they need.  Inspect your legs and feet daily and report any increased swelling, unusual redness or new wounds to your physician.  Wash your legs and feet regularly with mild soap and water and moisturize daily.  Continue to use compression pumps if prescribed by your physician.  Avoid overeating. Extra weight adds pressure on the veins in your legs.  Limit salty foods as they promote swelling or fluid retention in your legs.    Call the Wound Care Center if your wound reopens, if new wounds occur, or if you have any other questions about your follow up care [phone].

## 2024-08-02 ENCOUNTER — HOSPITAL ENCOUNTER (OUTPATIENT)
Dept: WOUND CARE | Age: 79
Discharge: HOME | End: 2024-08-02
Attending: PODIATRIST
Payer: MEDICARE

## 2024-08-02 DIAGNOSIS — L97.324 NON-PRESSURE CHRONIC ULCER OF LEFT ANKLE WITH NECROSIS OF BONE (HCC): Primary | ICD-10-CM

## 2024-08-02 PROCEDURE — 99215 OFFICE O/P EST HI 40 MIN: CPT

## 2024-08-02 RX ORDER — LIDOCAINE HYDROCHLORIDE 40 MG/ML
SOLUTION TOPICAL ONCE
OUTPATIENT
Start: 2024-08-02 | End: 2024-08-02

## 2024-08-02 RX ORDER — GENTAMICIN SULFATE 1 MG/G
OINTMENT TOPICAL ONCE
OUTPATIENT
Start: 2024-08-02 | End: 2024-08-02

## 2024-08-02 RX ORDER — BACITRACIN ZINC AND POLYMYXIN B SULFATE 500; 1000 [USP'U]/G; [USP'U]/G
OINTMENT TOPICAL ONCE
OUTPATIENT
Start: 2024-08-02 | End: 2024-08-02

## 2024-08-02 RX ORDER — LIDOCAINE HYDROCHLORIDE 20 MG/ML
JELLY TOPICAL ONCE
OUTPATIENT
Start: 2024-08-02 | End: 2024-08-02

## 2024-08-02 RX ORDER — IBUPROFEN 200 MG
TABLET ORAL ONCE
OUTPATIENT
Start: 2024-08-02 | End: 2024-08-02

## 2024-08-02 RX ORDER — GINSENG 100 MG
CAPSULE ORAL ONCE
OUTPATIENT
Start: 2024-08-02 | End: 2024-08-02

## 2024-08-02 RX ORDER — LIDOCAINE 50 MG/G
OINTMENT TOPICAL ONCE
OUTPATIENT
Start: 2024-08-02 | End: 2024-08-02

## 2024-08-02 RX ORDER — CLOBETASOL PROPIONATE 0.5 MG/G
OINTMENT TOPICAL ONCE
OUTPATIENT
Start: 2024-08-02 | End: 2024-08-02

## 2024-08-02 RX ORDER — BETAMETHASONE DIPROPIONATE 0.5 MG/G
CREAM TOPICAL ONCE
OUTPATIENT
Start: 2024-08-02 | End: 2024-08-02

## 2024-08-02 RX ORDER — LIDOCAINE 40 MG/G
CREAM TOPICAL ONCE
OUTPATIENT
Start: 2024-08-02 | End: 2024-08-02

## 2024-08-02 RX ORDER — SODIUM CHLOR/HYPOCHLOROUS ACID 0.033 %
SOLUTION, IRRIGATION IRRIGATION ONCE
OUTPATIENT
Start: 2024-08-02 | End: 2024-08-02

## 2024-08-02 RX ORDER — LIDOCAINE 40 MG/G
CREAM TOPICAL ONCE
Status: DISCONTINUED | OUTPATIENT
Start: 2024-08-02 | End: 2024-08-03 | Stop reason: HOSPADM

## 2024-08-02 RX ORDER — TRIAMCINOLONE ACETONIDE 1 MG/G
OINTMENT TOPICAL ONCE
OUTPATIENT
Start: 2024-08-02 | End: 2024-08-02

## 2024-08-02 NOTE — PROGRESS NOTES
[facility]  Progress Note and Procedure Note      Jessica Nails  AGE: 78 y.o.   GENDER: female  : 1945  TODAY'S DATE:  2024    Subjective:     Chief Complaint   Patient presents with    Wound Check     Right leg F/U         HISTORY of PRESENT ILLNESS HPI     Jessica Naisl is a 78 y.o. female who presents today for wound evaluation.  She is known to us from previous wound on her left leg and has had previous vascular intervention.    She has been changing the bandage as directed.  She has no other complaints at this time.  She states the wounds feel better.  She denies current nausea, vomiting, fever, chills, shortness of breath or chest pain.  Wound Pain:  moderate  Severity:  5 / 10   Wound Type:  arterial and refractory osteomyelitis  Modifying Factors:  edema, venous stasis, lymphedema, arterial insufficiency, decreased tissue oxygenation and substance abuse  Associated Signs/Symptoms:  edema, drainage and pain        PAST MEDICAL HISTORY        Diagnosis Date    CAD (coronary artery disease)     Hyperlipidemia     Hypertension     PAD (peripheral artery disease) (HCC)        PAST SURGICAL HISTORY    Past Surgical History:   Procedure Laterality Date    INVASIVE VASCULAR N/A 2024    Angiography lower ext right performed by Guicho Romero II, MD at [facility] CARDIAC CATH LAB    INVASIVE VASCULAR N/A 2024    Angioplasty peripheral artery performed by Guicho Romero II, MD at [facility] CARDIAC CATH LAB    IR ANGXPTA FEM POP W STENT         FAMILY HISTORY    Family History   Problem Relation Age of Onset    Coronary Art Dis Mother        SOCIAL HISTORY    Social History     Tobacco Use    Smoking status: Former     Current packs/day: 0.00     Types: Cigarettes     Quit date: 2022     Years since quittin.5    Smokeless tobacco: Never   Vaping Use    Vaping Use: Some days    Substances: Nicotine   Substance Use Topics    Alcohol use: Not Currently       ALLERGIES    No

## 2024-08-03 NOTE — PROGRESS NOTES
No current facility-administered medications for this visit.       Allergies:  Patient has no known allergies.     Review of Systems:   Constitutional: there has been no unanticipated weight loss. There's been no change in energy level, sleep pattern, or activity level.     Eyes: No visual changes or diplopia. No scleral icterus.  ENT: No Headaches, hearing loss or vertigo. No mouth sores or sore throat.  Cardiovascular: Reviewed in HPI  Respiratory: No cough or wheezing, no sputum production. No hematemesis.    Gastrointestinal: No abdominal pain, appetite loss, blood in stools. No change in bowel or bladder habits.  Genitourinary: No dysuria, trouble voiding, or hematuria.  Musculoskeletal:  No gait disturbance, weakness or joint complaints.  Integumentary: No rash or pruritis.  Neurological: No headache, diplopia, change in muscle strength, numbness or tingling. No change in gait, balance, coordination, mood, affect, memory, mentation, behavior.  Psychiatric: No anxiety, no depression.  Endocrine: No malaise, fatigue or temperature intolerance. No excessive thirst, fluid intake, or urination. No tremor.  Hematologic/Lymphatic: No abnormal bruising or bleeding, blood clots or swollen lymph nodes.  Allergic/Immunologic: No nasal congestion or hives.    Physical Examination:    There were no vitals filed for this visit.       General appearance: alert, appears stated age, cooperative, and no distress  Head: Normocephalic, without obvious abnormality, atraumatic  Neck: no adenopathy, no carotid bruit, no JVD, supple, symmetrical, trachea midline, and thyroid: not enlarged, symmetric, no tenderness/mass/nodules  Lungs: clear to auscultation bilaterally  Heart: regular rate and rhythm, S1, S2 normal, no murmur, click, rub or gallop  Abdomen: soft, non-tender. Bowel sounds normal. No masses,  no organomegaly  Extremities: extremities normal, atraumatic, no cyanosis or edema    Pulses: 2+ palpable right popliteal

## 2024-08-06 ENCOUNTER — OFFICE VISIT (OUTPATIENT)
Dept: VASCULAR SURGERY | Age: 79
End: 2024-08-06
Payer: MEDICARE

## 2024-08-06 VITALS
HEIGHT: 64 IN | WEIGHT: 102.6 LBS | DIASTOLIC BLOOD PRESSURE: 68 MMHG | SYSTOLIC BLOOD PRESSURE: 116 MMHG | BODY MASS INDEX: 17.52 KG/M2

## 2024-08-06 DIAGNOSIS — I70.213 ATHEROSCLEROSIS OF NATIVE ARTERIES OF EXTREMITIES WITH INTERMITTENT CLAUDICATION, BILATERAL LEGS (HCC): Primary | ICD-10-CM

## 2024-08-06 PROCEDURE — G8427 DOCREV CUR MEDS BY ELIG CLIN: HCPCS | Performed by: SURGERY

## 2024-08-06 PROCEDURE — 3074F SYST BP LT 130 MM HG: CPT | Performed by: SURGERY

## 2024-08-06 PROCEDURE — G8400 PT W/DXA NO RESULTS DOC: HCPCS | Performed by: SURGERY

## 2024-08-06 PROCEDURE — 99212 OFFICE O/P EST SF 10 MIN: CPT | Performed by: SURGERY

## 2024-08-06 PROCEDURE — G8419 CALC BMI OUT NRM PARAM NOF/U: HCPCS | Performed by: SURGERY

## 2024-08-06 PROCEDURE — 1090F PRES/ABSN URINE INCON ASSESS: CPT | Performed by: SURGERY

## 2024-08-06 PROCEDURE — 3078F DIAST BP <80 MM HG: CPT | Performed by: SURGERY

## 2024-08-06 PROCEDURE — 1036F TOBACCO NON-USER: CPT | Performed by: SURGERY

## 2024-08-06 PROCEDURE — 1123F ACP DISCUSS/DSCN MKR DOCD: CPT | Performed by: SURGERY

## 2024-11-25 ENCOUNTER — TELEPHONE (OUTPATIENT)
Dept: VASCULAR SURGERY | Age: 79
End: 2024-11-25

## 2024-11-25 NOTE — TELEPHONE ENCOUNTER
RYLEY to schedule BLE ADS in the [facility] office, after 1/6/25. No appt needed with Dr. Romero. Patient will be contacted with results.

## 2025-01-20 ENCOUNTER — TELEPHONE (OUTPATIENT)
Dept: VASCULAR SURGERY | Age: 80
End: 2025-01-20

## 2025-01-20 DIAGNOSIS — I70.213 ATHEROSCLEROSIS OF NATIVE ARTERIES OF EXTREMITIES WITH INTERMITTENT CLAUDICATION, BILATERAL LEGS (HCC): Primary | ICD-10-CM

## 2025-01-20 NOTE — TELEPHONE ENCOUNTER
Left message with results of BLE arterial duplex which is stable.  Plan to repeat in 1 year.  Instructed to call with new or worsening symptoms in legs or develops wound.

## 2025-03-24 NOTE — PROGRESS NOTES
[facility]   Cardiac Evaluation      Patient: Jessica Nails  YOB: 1945         Chief Complaint   Patient presents with    Hypertension    Hyperlipidemia        Referring provider: Lavon Morrow DO    History of Present Illness:   Ms Nails is seen today to re-establish with cardiologist. She was last seen in 2022.  Her cardiac history hypertension, hyperlipidemia. 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  7/1/24 she underwent right SFA PTA w/ Dr Romero. Family history of mother having CABG in her 80's. Ms Nails underwent Lt SFA intervention 12/10/21 with Dr Romero. She has a chronic ulcer of left ankle for which she is going to wound clinic ; following with Dr Gage. Jessica quit smoking January, 2022 because she didn't want to \"lose my leg\". She is doing very well with her abstinence.     She went to [facility] on 1/26/25 w/ nausea and vomiting. Symptoms spontaneously resolved. She was diagnosed with vertigo. K was 2.9 on admission and she received replacement.    Millicent states prior to going to the hospital she felt \"drained\" and eyes were \"darting back and forth\" the night before. She went to bed, got up the next morning and continued to feel bad. She was dizzy, nauseated, and vomiting. Jessica denies chest pain, palpitations, or edema. Jessica states she does not have an appetite and nothing tastes good to her. 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 She does not do regular exercise.        Past Medical History:   has a past medical history of CAD (coronary artery disease), Hyperlipidemia, Hypertension, and PAD (peripheral artery disease).    Surgical History:   has a past surgical history that includes IR ANGIOPLASTY FEM POP W STENT; invasive vascular (N/A, 7/1/2024); and invasive vascular (N/A, 7/1/2024).     Current Outpatient Medications   Medication Sig Dispense Refill    gabapentin (NEURONTIN) 300 MG capsule Take

## 2025-03-25 ENCOUNTER — OFFICE VISIT (OUTPATIENT)
Dept: CARDIOLOGY CLINIC | Age: 80
End: 2025-03-25

## 2025-03-25 VITALS
DIASTOLIC BLOOD PRESSURE: 60 MMHG | HEART RATE: 61 BPM | HEIGHT: 64 IN | SYSTOLIC BLOOD PRESSURE: 122 MMHG | BODY MASS INDEX: 17.07 KG/M2 | OXYGEN SATURATION: 99 % | WEIGHT: 100 LBS

## 2025-03-25 DIAGNOSIS — E78.49 OTHER HYPERLIPIDEMIA: ICD-10-CM

## 2025-03-25 DIAGNOSIS — R42 DIZZINESS: ICD-10-CM

## 2025-03-25 DIAGNOSIS — I10 PRIMARY HYPERTENSION: Primary | ICD-10-CM

## 2025-03-25 DIAGNOSIS — I49.1 PAC (PREMATURE ATRIAL CONTRACTION): ICD-10-CM

## 2025-03-25 RX ORDER — GABAPENTIN 300 MG/1
300 CAPSULE ORAL NIGHTLY
COMMUNITY
Start: 2024-12-30

## 2025-05-02 ENCOUNTER — RESULTS FOLLOW-UP (OUTPATIENT)
Dept: CARDIOLOGY CLINIC | Age: 80
End: 2025-05-02

## (undated) DEVICE — GUIDEWIRE VASC ANGLED 3 CM 0.035 INX180 CM STD COR ZIPWIRE

## (undated) DEVICE — CATHETER ANGIO 5FR L65CM 0.035IN RIM SEL BRAID SFT RADPQ

## (undated) DEVICE — Device: Brand: NOMOLINE™ LH ADULT NASAL CO2 CANNULA WITH O2 4M

## (undated) DEVICE — CATH LAB PACK: Brand: MEDLINE INDUSTRIES, INC.

## (undated) DEVICE — CATHETER ANGIO SOS OMNI 2 038 4.5 CM 5 FRX80 CM VS SOFT-VU

## (undated) DEVICE — DESTINATION PERIPHERAL GUIDING SHEATH: Brand: DESTINATION

## (undated) DEVICE — KIT AT-X65 ANGIOTOUCH HAND CONTROLLER

## (undated) DEVICE — KIT MFLD ISOLATN NACL CNTRST PRT TBNG SPIK W/ PRSS TRNSDUC

## (undated) DEVICE — PINNACLE INTRODUCER SHEATH: Brand: PINNACLE

## (undated) DEVICE — INTRODUCER SHTH 0.018 IN 4 FRX70 CM 21 GAX7 CM KT MIC VSI

## (undated) DEVICE — GUIDEWIRE WITH ICE™ HYDROPHILIC COATING: Brand: V-18™ CONTROL WIRE™

## (undated) DEVICE — GUIDEWIRE ANGIO L150CM OD0.035IN STR FIX PTFE SLD SUPP

## (undated) DEVICE — Device

## (undated) DEVICE — GUIDEWIRE VASC L260CM DIA0.035IN TAPR 3CM HYDRPHLC NIT ANG

## (undated) DEVICE — CXI SUPPORT CATHETER: Brand: CXI

## (undated) DEVICE — ARMADA 18 PTA CATHETER 6.0 MM X 100 MM X 150 CM / OVER-THE-WIRE: Brand: ARMADA

## (undated) DEVICE — PAD, DEFIB, ADULT, RADIOTRANS, PHYSIO: Brand: MEDLINE